# Patient Record
Sex: MALE | Employment: FULL TIME | URBAN - METROPOLITAN AREA
[De-identification: names, ages, dates, MRNs, and addresses within clinical notes are randomized per-mention and may not be internally consistent; named-entity substitution may affect disease eponyms.]

---

## 2023-10-20 ENCOUNTER — OFFICE VISIT (OUTPATIENT)
Dept: PRIMARY CARE | Facility: CLINIC | Age: 38
End: 2023-10-20
Payer: COMMERCIAL

## 2023-10-20 VITALS
WEIGHT: 220 LBS | TEMPERATURE: 97 F | DIASTOLIC BLOOD PRESSURE: 80 MMHG | BODY MASS INDEX: 29.8 KG/M2 | SYSTOLIC BLOOD PRESSURE: 142 MMHG | HEIGHT: 72 IN

## 2023-10-20 DIAGNOSIS — Z00.00 HEALTHCARE MAINTENANCE: Primary | ICD-10-CM

## 2023-10-20 PROCEDURE — 99395 PREV VISIT EST AGE 18-39: CPT | Performed by: INTERNAL MEDICINE

## 2023-10-20 PROCEDURE — 85025 COMPLETE CBC W/AUTO DIFF WBC: CPT | Performed by: INTERNAL MEDICINE

## 2023-10-20 PROCEDURE — 80061 LIPID PANEL: CPT | Performed by: INTERNAL MEDICINE

## 2023-10-20 PROCEDURE — 1036F TOBACCO NON-USER: CPT | Performed by: INTERNAL MEDICINE

## 2023-10-20 PROCEDURE — 80053 COMPREHEN METABOLIC PANEL: CPT | Performed by: INTERNAL MEDICINE

## 2023-10-20 PROCEDURE — 84443 ASSAY THYROID STIM HORMONE: CPT | Performed by: INTERNAL MEDICINE

## 2023-10-20 ASSESSMENT — ENCOUNTER SYMPTOMS
DEPRESSION: 0
OCCASIONAL FEELINGS OF UNSTEADINESS: 0
LOSS OF SENSATION IN FEET: 0

## 2023-10-20 ASSESSMENT — PAIN SCALES - GENERAL: PAINLEVEL: 0-NO PAIN

## 2023-10-23 ENCOUNTER — TELEPHONE (OUTPATIENT)
Dept: PRIMARY CARE | Facility: CLINIC | Age: 38
End: 2023-10-23

## 2023-10-23 DIAGNOSIS — F90.9 ATTENTION DEFICIT HYPERACTIVITY DISORDER (ADHD), UNSPECIFIED ADHD TYPE: ICD-10-CM

## 2023-11-12 DIAGNOSIS — I10 HYPERTENSION, UNSPECIFIED TYPE: Primary | ICD-10-CM

## 2023-11-12 RX ORDER — OLMESARTAN MEDOXOMIL 20 MG/1
20 TABLET ORAL DAILY
COMMUNITY
End: 2023-11-12 | Stop reason: SDUPTHER

## 2023-11-12 RX ORDER — OLMESARTAN MEDOXOMIL 20 MG/1
20 TABLET ORAL DAILY
Qty: 90 TABLET | Refills: 3 | Status: SHIPPED | OUTPATIENT
Start: 2023-11-12

## 2023-12-18 ENCOUNTER — TELEPHONE (OUTPATIENT)
Dept: PRIMARY CARE | Facility: CLINIC | Age: 38
End: 2023-12-18
Payer: COMMERCIAL

## 2023-12-18 DIAGNOSIS — Z83.79 FAMILY HISTORY OF CELIAC DISEASE: Primary | ICD-10-CM

## 2024-01-08 NOTE — PROGRESS NOTES
Patient refused flu shot  
Subjective   Patient ID: Scott Garcia is a 38 y.o. male who presents for health maintenance.    HPI   38 years old male comes in to see me open change few years/with the same medical condition including ADHD and generalized anxiety disorder.  He has a history of hypertension.  He is  and has 3 children he lives in [place].  He is an owner of 2 businesses.  His medications are olmesartan for hypertension, Adderall 20 mg in the morning 20 mg at night ER.  He was taking an antidepressant called Trintellix but now he stopped taking it for the last 4 to 6 weeks because he feels he is not depressed, he also take gabapentin 300 mg 2 or 3 a day for sciatica pain.  Lamotrigine 1 a day in the morning to replace known addictive drug for antianxiety.  He was seeing a psychiatrist on a regular basis [place] but she moved and left to [place].  He is about to connect with a new psychiatrist.  Review of Systems  12 system review 12 systems are negative  Objective   /80 (BP Location: Left arm, Patient Position: Sitting, BP Cuff Size: Large adult)   Temp 36.1 °C (97 °F) (Temporal)   Ht 1.829 m (6')   Wt 99.8 kg (220 lb)   BMI 29.84 kg/m²     Physical Exam  Alert oriented in no acute distress.  Pleasant cooperative.  Nonicteric sclera no jaundice.  Neck supple no masses no lymph node thyromegaly or jugular venous distention.  Face symmetrical cranial nerves intact.  Lungs clear no rales wheezing or crackles.  Heart normal S1 and S2 regular rhythm.  Abdomen benign neurologically intact  Assessment/Plan   Diagnoses and all orders for this visit:  Healthcare maintenance  -     CBC  -     Comprehensive Metabolic Panel  -     Lipid Panel  -     Thyroid Stimulating Hormone         
Patient requests all Lab, Cardiology, and Radiology Results on their Discharge Instructions

## 2024-02-10 ENCOUNTER — LAB (OUTPATIENT)
Dept: LAB | Facility: LAB | Age: 39
End: 2024-02-10
Payer: COMMERCIAL

## 2024-02-10 DIAGNOSIS — Z83.79 FAMILY HISTORY OF CELIAC DISEASE: ICD-10-CM

## 2024-02-10 PROCEDURE — 86258 DGP ANTIBODY EACH IG CLASS: CPT

## 2024-02-10 PROCEDURE — 86364 TISS TRNSGLTMNASE EA IG CLAS: CPT

## 2024-02-10 PROCEDURE — 83516 IMMUNOASSAY NONANTIBODY: CPT

## 2024-02-10 PROCEDURE — 36415 COLL VENOUS BLD VENIPUNCTURE: CPT

## 2024-02-11 LAB
GLIADIN PEPTIDE IGA SER IA-ACNC: 1.9 U/ML
GLIADIN PEPTIDE IGG SER IA-ACNC: NORMAL
TTG IGA SER IA-ACNC: <1 U/ML
TTG IGG SER IA-ACNC: NORMAL

## 2024-02-13 LAB
GLIADIN PEPTIDE IGG SER IA-ACNC: 0.58 FLU (ref 0–4.99)
TTG IGG SER IA-ACNC: <0.82 FLU (ref 0–4.99)

## 2024-06-13 ENCOUNTER — TELEPHONE (OUTPATIENT)
Dept: PRIMARY CARE | Facility: CLINIC | Age: 39
End: 2024-06-13

## 2024-06-13 ENCOUNTER — OFFICE VISIT (OUTPATIENT)
Dept: PRIMARY CARE | Facility: CLINIC | Age: 39
End: 2024-06-13
Payer: COMMERCIAL

## 2024-06-13 VITALS
HEIGHT: 72 IN | BODY MASS INDEX: 27.9 KG/M2 | SYSTOLIC BLOOD PRESSURE: 134 MMHG | TEMPERATURE: 97.7 F | DIASTOLIC BLOOD PRESSURE: 82 MMHG | WEIGHT: 206 LBS

## 2024-06-13 DIAGNOSIS — S29.011A PECTORALIS MUSCLE STRAIN, INITIAL ENCOUNTER: Primary | ICD-10-CM

## 2024-06-13 PROCEDURE — 1036F TOBACCO NON-USER: CPT | Performed by: INTERNAL MEDICINE

## 2024-06-13 PROCEDURE — 99213 OFFICE O/P EST LOW 20 MIN: CPT | Performed by: INTERNAL MEDICINE

## 2024-06-13 ASSESSMENT — PAIN SCALES - GENERAL: PAINLEVEL: 6

## 2024-06-13 ASSESSMENT — ENCOUNTER SYMPTOMS
LOSS OF SENSATION IN FEET: 0
DEPRESSION: 0
OCCASIONAL FEELINGS OF UNSTEADINESS: 0

## 2024-06-13 NOTE — PROGRESS NOTES
Subjective   Patient ID: Scott Garcia is a 39 y.o. male who presents for Hypertension, Hypothyroidism, Hyperlipidemia, and Diabetes.    HPI   39 years old male comes in to see me because he sprained his right pectoral muscle last night when he was helping a friend plug prior.  He felt sharp pain instantly when he was down on his knees working on a tire.  He used ice on it right away and it helped.  Today still feels sore and painful he thought he ruptured something.  He wanted to be checked for that reason.  Review of Systems  12 system reviewed 12 system are negative.  Objective   /82 (BP Location: Left arm, Patient Position: Sitting, BP Cuff Size: Adult)   Temp 36.5 °C (97.7 °F) (Temporal)   Ht 1.829 m (6')   Wt 93.4 kg (206 lb)   BMI 27.94 kg/m²     Physical Exam  Alert oriented in no distress.  Nonicteric sclera or jaundice.  Face symmetrical cranial nerves intact.  Neck supple no masses no lymph node no thyromegaly.  Lungs clear no rales wheezing or crackles.  Heart normal S1 and S2 regular rhythm.  Abdomen benign and neurologically intact.  Examination of the right shoulder and arm within normal limit.  He is able to move his arm in all direction.  He had no vascular or neurological deficit.  Pain at the area between the pectoral muscle and the arm or shoulder near the anterior part of the axilla.  Mildly tender to touch.  He strained his muscle obviously.  Stop lifting do not do anything with the right arm to be gentle with it.  Use heating pad on it on and off for half an hour every couple hours for few days.  Motrin lotion to massage it with.  You may take either Tylenol or Advil or Motrin if you have any pain.  Give it few days it will heal okay  Assessment/Plan   Diagnoses and all orders for this visit:  Pectoralis muscle strain, initial encounter

## 2024-06-13 NOTE — TELEPHONE ENCOUNTER
Pt called and would like an mri done. He believes he tore his right pectoral muscle last night while plugging a tire. He did not want to schedule he request a call.

## 2024-07-01 ENCOUNTER — TELEPHONE (OUTPATIENT)
Dept: PRIMARY CARE | Facility: CLINIC | Age: 39
End: 2024-07-01
Payer: COMMERCIAL

## 2024-07-01 NOTE — TELEPHONE ENCOUNTER
"Pt called and would like test done on his right rao. It still hurt to the touch and it is \"misshaped\". He would like an MRI, Ultrasound or something done to see what is going on with it.     He states he usually workout but couldn't since this pain is still there.   "

## 2024-07-02 DIAGNOSIS — S29.9XXA CHEST INJURY, INITIAL ENCOUNTER: Primary | ICD-10-CM

## 2024-07-03 ENCOUNTER — HOSPITAL ENCOUNTER (OUTPATIENT)
Dept: RADIOLOGY | Facility: HOSPITAL | Age: 39
Discharge: HOME | End: 2024-07-03
Payer: COMMERCIAL

## 2024-07-03 ENCOUNTER — CLINICAL SUPPORT (OUTPATIENT)
Dept: ORTHOPEDIC SURGERY | Facility: CLINIC | Age: 39
End: 2024-07-03
Payer: COMMERCIAL

## 2024-07-03 DIAGNOSIS — M25.511 RIGHT SHOULDER PAIN, UNSPECIFIED CHRONICITY: ICD-10-CM

## 2024-07-03 DIAGNOSIS — S49.91XD RIGHT SHOULDER INJURY, SUBSEQUENT ENCOUNTER: Primary | ICD-10-CM

## 2024-07-03 DIAGNOSIS — S29.011A TRAUMATIC RUPTURE OF RIGHT PECTORALIS MAJOR TENDON: Primary | ICD-10-CM

## 2024-07-03 DIAGNOSIS — M25.511 RIGHT SHOULDER PAIN, UNSPECIFIED CHRONICITY: Primary | ICD-10-CM

## 2024-07-03 DIAGNOSIS — S49.91XA RIGHT SHOULDER INJURY, INITIAL ENCOUNTER: ICD-10-CM

## 2024-07-03 DIAGNOSIS — S49.91XD RIGHT SHOULDER INJURY, SUBSEQUENT ENCOUNTER: ICD-10-CM

## 2024-07-03 PROCEDURE — 99204 OFFICE O/P NEW MOD 45 MIN: CPT

## 2024-07-03 PROCEDURE — 73030 X-RAY EXAM OF SHOULDER: CPT | Mod: RIGHT SIDE | Performed by: RADIOLOGY

## 2024-07-03 PROCEDURE — 73030 X-RAY EXAM OF SHOULDER: CPT | Mod: RT

## 2024-07-03 PROCEDURE — 71550 MRI CHEST W/O DYE: CPT

## 2024-07-03 NOTE — PROGRESS NOTES
"HPI  Fran Garcia is a 39 y.o. male  in office today for   Chief Complaint   Patient presents with    Right Shoulder - Pain     Pt states he was plugging a tire and felt a painful shock in his shoulder, side of pec and under arm. He doesn't have a lot ofpain-he has been trying to keep the arm moving, but is noticing a \"hole\" in his muscle when he attempts to push anything. He feels like his pec muscle is uneven when flexing.    .  he states that he did this 3 weeks ago.  Saw his PCP who told him to refrain from lifting and to massage the area which he has been doing.      Medication  Current Outpatient Medications on File Prior to Visit   Medication Sig Dispense Refill    olmesartan (BENIcar) 20 mg tablet Take 1 tablet (20 mg) by mouth once daily. 90 tablet 3     No current facility-administered medications on file prior to visit.       Physical Exam  Constitutional: well developed, well nourished male in no acute distress  Psychiatric: normal mood, appropriate affect  Eyes: sclera anicteric  HENT: normocephalic/atraumatic  CV: regular rate and rhythm   Respiratory: non labored breathing  Integumentary: no rash  Neurological: moves all extremities    Right Shoulder Exam     Tenderness   The patient is experiencing no tenderness.    Range of Motion   The patient has normal right shoulder ROM.    Comments:  Visible deformity at pec insertion, axillary side with tendon retraction on palpation. Nipple location is symmetric.  No edema or ecchymosis.              Imaging/Lab:  X-rays were taken today which were reviewed by myself and read by myself and show no acute fracture or dislocation.  No degenerative changes of the shoulder      Assessment  Assessment: Right shoulder injury with suspect pectoralis tendon rupture    Plan  Plan:  History, physical exam, and imaging were reviewed with patient. Given visible deformity of the pectoralis muscle, ordering MRI to assess for tendon rupture.  Should continue to not lift " weights.  RICE and antiinflammatories as needed.  Follow Up: After MRI, will refer to Dr Reyes as appropriate    All questions were answered for the patient prior to end of exam and patient addressed their understanding.    Day Badillo PA-C  07/03/24

## 2024-07-04 ENCOUNTER — TELEPHONE (OUTPATIENT)
Dept: PRIMARY CARE | Facility: CLINIC | Age: 39
End: 2024-07-04
Payer: COMMERCIAL

## 2024-07-04 NOTE — TELEPHONE ENCOUNTER
I called the patient this morning on July 4 to inform him that he did have a rupture of the tendon on the MR and x-ray and he need to see shoulder specialist orthopedist which she is with the intervention of Dr. Bravo and Dr. Marin who is familiar with tense father and will see him on Tuesday take care of business.  I advised him to take it easy no lifting no chopping wood and to put a sling on his arm in case he is in pain take Tylenol or Advil until he sees the orthopedist.  He was aware of the findings since the orthopedist called him yesterday.

## 2024-07-05 ENCOUNTER — TELEPHONE (OUTPATIENT)
Dept: ORTHOPEDIC SURGERY | Facility: CLINIC | Age: 39
End: 2024-07-05
Payer: COMMERCIAL

## 2024-07-05 NOTE — TELEPHONE ENCOUNTER
Per Day Badillo PA-C, LM for patient that she needs him to see Dr Reyes in [place] Monday to go over his MRI he does have a pectoral tear in his right shoulder    Admission Reconciliation is Completed  Discharge Reconciliation is Completed

## 2024-07-09 ENCOUNTER — OFFICE VISIT (OUTPATIENT)
Dept: ORTHOPEDIC SURGERY | Facility: HOSPITAL | Age: 39
End: 2024-07-09
Payer: COMMERCIAL

## 2024-07-09 VITALS — WEIGHT: 207 LBS | BODY MASS INDEX: 28.04 KG/M2 | HEIGHT: 72 IN

## 2024-07-09 DIAGNOSIS — S29.011A TRAUMATIC RUPTURE OF RIGHT PECTORALIS MAJOR TENDON: ICD-10-CM

## 2024-07-09 DIAGNOSIS — S29.9XXA CHEST INJURY, INITIAL ENCOUNTER: ICD-10-CM

## 2024-07-09 PROCEDURE — L3670 SO ACRO/CLAV CAN WEB PRE OTS: HCPCS | Performed by: ORTHOPAEDIC SURGERY

## 2024-07-09 PROCEDURE — 1036F TOBACCO NON-USER: CPT | Performed by: ORTHOPAEDIC SURGERY

## 2024-07-09 PROCEDURE — 99214 OFFICE O/P EST MOD 30 MIN: CPT | Performed by: ORTHOPAEDIC SURGERY

## 2024-07-09 PROCEDURE — 99204 OFFICE O/P NEW MOD 45 MIN: CPT | Performed by: ORTHOPAEDIC SURGERY

## 2024-07-09 RX ORDER — DEXTROAMPHETAMINE SACCHARATE, AMPHETAMINE ASPARTATE, DEXTROAMPHETAMINE SULFATE AND AMPHETAMINE SULFATE 1.25; 1.25; 1.25; 1.25 MG/1; MG/1; MG/1; MG/1
1 TABLET ORAL NIGHTLY
COMMUNITY
Start: 2024-06-12

## 2024-07-09 RX ORDER — LAMOTRIGINE 100 MG/1
1 TABLET ORAL
COMMUNITY
Start: 2024-04-01

## 2024-07-09 RX ORDER — GABAPENTIN 300 MG/1
1 CAPSULE ORAL
COMMUNITY
Start: 2024-06-12

## 2024-07-09 ASSESSMENT — PAIN - FUNCTIONAL ASSESSMENT: PAIN_FUNCTIONAL_ASSESSMENT: 0-10

## 2024-07-09 ASSESSMENT — PAIN SCALES - GENERAL: PAINLEVEL_OUTOF10: 2

## 2024-07-09 NOTE — LETTER
July 9, 2024     Patient: Fran Garcia   YOB: 1985   Date of Visit: 7/9/2024       To Whom It May Concern:    It is my medical opinion that Fran Garcia  should not perform any work until seen for his first post-operative visit in approximately 2 weeks .    If you have any questions or concerns, please don't hesitate to call.         Sincerely,        Ti Bravo MD    CC: No Recipients

## 2024-07-09 NOTE — PROGRESS NOTES
Subjective   Patient ID: Fran Garcia is a 39 y.o. male    Chief Complaint:   Chief Complaint   Patient presents with    Right Shoulder - Pain        Last Surgery: No surgery found  Date of Last Surgery: No surgery found    HPI  Fran Garcia is a 39 y.o. ambidextrous male presenting for right shoulder pain.     He is here today after hurting himself in June while helping a friend plug a tire. He initially felt a sharp pain. He iced and used OTC medications as needed and felt better. However, his pain has continued and it is affecting his daily activities.     He does like weight lifting and is on several       Objective   Patient is a well-developed, well-nourished male in no acute distress.  Breathes with normal chest rises.  Pupils are round and symmetric today.  Awake, alert, and oriented x3.      Examination of the left shoulder today reveals the skin to be intact. There is no sign of any atrophy, lesions, or abrasions. There is no pain to palpation of the bony prominences. Cervical lymphadenopathy examined, and this was negative. Patient had 5 out of 5 wrist flexion, extension, and thumb extension bilaterally. Sensation was intact to light touch to median, ulnar, radial axillary, and musculocutaneous nerves bilaterally. Positive radial pulse bilaterally. Provocative maneuvers today were negative. Range of motion of the left shoulder revealed 0-170° of forward elevation, 0-60° of external rotation, and internal rotation was to T-12.     Examination of the right shoulder today reveals the skin to be intact. There is no sign of any atrophy, lesions, or abrasions. There is no pain to palpation of the bony prominences. Cervical lymphadenopathy examined, and this was negative. Patient had 5 out of 5 wrist flexion, extension, and thumb extension, bilaterally. Sensation was intact to light touch to median, ulnar, radial, axillary, and musculocutaneous nerves bilaterally. Positive radial pulse bilaterally. Provocative  maneuvers today were negative.  Range of motion of the right shoulder revealed 0-170° of forward elevation, 0-60° of external rotation, and internal rotation up to T-12. Obvious defect in axillary fold. Palpable lump along pectoralis major.       Imaging:  X-rays of the right shoulder taken 07/03/24 and interpreted by me show no signs of any fracture, dislocation, arthritis or proximal humerus migration.  Some AC joint arthritis, asymptomatic     Assessment/Plan   Chest injury, initial encounter  Patient with a 4 week old right pectoralis rupture    We had a long discussion regarding his diagnosis. We talked about recovery. He will be in a sling for two weeks post surgery. He was encouraged to get an ice machine to aid in recovery. We discussed the risks of surgery including infection, bleeding, and nerve injury. He understands that 90-95% function is expected after surgery. He understands it is out patient and a nerve block will be performed. He cannot pull, push, or reach for things after surgery. 4 month recovery.  We are recmending surgery for this    Patient was prescribed a shoulder sling for postoperative care. The patient will have weakness, instability and/or deformity of their right arm which requires stabilization from this orthosis to improve their function after surgery.     Verbal and written instructions for the use, wear schedule, cleaning and application of this item were given. Patient was instructed that should the brace result in increased pain, decreased sensation, increased swelling, or an overall worsening of their medical condition, to please contact our office immediately at [phone].     Orthotic management and training was provided for skin care, modifications due to healing tissues, edema changes, interruption in skin integrity, and safety precautions with the orthosis.    PLAN for RIGHT pectoralis major repair   No orders of the defined types were placed in this encounter.    Follow  up will be scheduled appropriately.     Scribe Attestation  By signing my name below, I, Bekah Steve   attest that this documentation has been prepared under the direction and in the presence of Ti Bravo MD.

## 2024-07-10 ENCOUNTER — LAB (OUTPATIENT)
Dept: LAB | Facility: LAB | Age: 39
End: 2024-07-10
Payer: COMMERCIAL

## 2024-07-10 ENCOUNTER — PRE-ADMISSION TESTING (OUTPATIENT)
Dept: PREADMISSION TESTING | Facility: HOSPITAL | Age: 39
End: 2024-07-10
Payer: COMMERCIAL

## 2024-07-10 VITALS
DIASTOLIC BLOOD PRESSURE: 74 MMHG | RESPIRATION RATE: 14 BRPM | OXYGEN SATURATION: 98 % | SYSTOLIC BLOOD PRESSURE: 143 MMHG | HEIGHT: 72 IN | HEART RATE: 79 BPM | BODY MASS INDEX: 28.37 KG/M2 | WEIGHT: 209.44 LBS | TEMPERATURE: 97.9 F

## 2024-07-10 DIAGNOSIS — S29.011A TRAUMATIC RUPTURE OF RIGHT PECTORALIS MAJOR TENDON: ICD-10-CM

## 2024-07-10 DIAGNOSIS — Z01.818 PREOP TESTING: ICD-10-CM

## 2024-07-10 DIAGNOSIS — Z01.818 PREOP TESTING: Primary | ICD-10-CM

## 2024-07-10 LAB
ANION GAP SERPL CALC-SCNC: 14 MMOL/L (ref 10–20)
BUN SERPL-MCNC: 19 MG/DL (ref 6–23)
CALCIUM SERPL-MCNC: 9.7 MG/DL (ref 8.6–10.3)
CHLORIDE SERPL-SCNC: 99 MMOL/L (ref 98–107)
CO2 SERPL-SCNC: 27 MMOL/L (ref 21–32)
CREAT SERPL-MCNC: 0.97 MG/DL (ref 0.5–1.3)
EGFRCR SERPLBLD CKD-EPI 2021: >90 ML/MIN/1.73M*2
ERYTHROCYTE [DISTWIDTH] IN BLOOD BY AUTOMATED COUNT: 12.3 % (ref 11.5–14.5)
GLUCOSE SERPL-MCNC: 88 MG/DL (ref 74–99)
HCT VFR BLD AUTO: 44 % (ref 41–52)
HGB BLD-MCNC: 15 G/DL (ref 13.5–17.5)
MCH RBC QN AUTO: 28.7 PG (ref 26–34)
MCHC RBC AUTO-ENTMCNC: 34.1 G/DL (ref 32–36)
MCV RBC AUTO: 84 FL (ref 80–100)
NRBC BLD-RTO: NORMAL /100{WBCS}
PLATELET # BLD AUTO: 327 X10*3/UL (ref 150–450)
POTASSIUM SERPL-SCNC: 4.1 MMOL/L (ref 3.5–5.3)
RBC # BLD AUTO: 5.22 X10*6/UL (ref 4.5–5.9)
SODIUM SERPL-SCNC: 136 MMOL/L (ref 136–145)
WBC # BLD AUTO: 9.2 X10*3/UL (ref 4.4–11.3)

## 2024-07-10 PROCEDURE — 85027 COMPLETE CBC AUTOMATED: CPT

## 2024-07-10 PROCEDURE — 36415 COLL VENOUS BLD VENIPUNCTURE: CPT

## 2024-07-10 PROCEDURE — 80048 BASIC METABOLIC PNL TOTAL CA: CPT

## 2024-07-10 PROCEDURE — 99203 OFFICE O/P NEW LOW 30 MIN: CPT

## 2024-07-10 RX ORDER — LORAZEPAM 0.5 MG/1
0.5 TABLET ORAL EVERY 6 HOURS PRN
COMMUNITY

## 2024-07-10 RX ORDER — IBUPROFEN 600 MG/1
600 TABLET ORAL EVERY 6 HOURS PRN
COMMUNITY

## 2024-07-10 RX ORDER — DEXTROMETHORPHAN HYDROBROMIDE, GUAIFENESIN 5; 100 MG/5ML; MG/5ML
650 LIQUID ORAL EVERY 8 HOURS PRN
COMMUNITY

## 2024-07-10 RX ORDER — BISMUTH SUBSALICYLATE 262 MG
1 TABLET,CHEWABLE ORAL DAILY
COMMUNITY

## 2024-07-10 ASSESSMENT — DUKE ACTIVITY SCORE INDEX (DASI)
CAN YOU DO LIGHT WORK AROUND THE HOUSE LIKE DUSTING OR WASHING DISHES: YES
CAN YOU WALK A BLOCK OR TWO ON LEVEL GROUND: YES
DASI METS SCORE: 9.9
CAN YOU DO HEAVY WORK AROUND THE HOUSE LIKE SCRUBBING FLOORS OR LIFTING AND MOVING HEAVY FURNITURE: YES
CAN YOU PARTICIPATE IN MODERATE RECREATIONAL ACTIVITIES LIKE GOLF, BOWLING, DANCING, DOUBLES TENNIS OR THROWING A BASEBALL OR FOOTBALL: YES
CAN YOU RUN A SHORT DISTANCE: YES
CAN YOU DO YARD WORK LIKE RAKING LEAVES, WEEDING OR PUSHING A MOWER: YES
CAN YOU CLIMB A FLIGHT OF STAIRS OR WALK UP A HILL: YES
CAN YOU DO MODERATE WORK AROUND THE HOUSE LIKE VACUUMING, SWEEPING FLOORS OR CARRYING GROCERIES: YES
CAN YOU TAKE CARE OF YOURSELF (EAT, DRESS, BATHE, OR USE TOILET): YES
CAN YOU HAVE SEXUAL RELATIONS: YES
CAN YOU WALK INDOORS, SUCH AS AROUND YOUR HOUSE: YES
TOTAL_SCORE: 58.2
CAN YOU PARTICIPATE IN STRENOUS SPORTS LIKE SWIMMING, SINGLES TENNIS, FOOTBALL, BASKETBALL, OR SKIING: YES

## 2024-07-10 ASSESSMENT — PAIN SCALES - GENERAL: PAINLEVEL_OUTOF10: 5 - MODERATE PAIN

## 2024-07-10 NOTE — H&P (VIEW-ONLY)
"CPM/PAT Evaluation       Name: Fran Garcia (Fran Garcia \"Scott\")  /Age: 1985/39 y.o.     In-Person       Chief Complaint: Traumatic rupture of right pectoralis major tendon     HPI  Patient is an alert and oriented 39 year old male scheduled for a right pectoralis on 2024 with Dr. Bravo for traumatic rupture of right pectoralis major tendon. PMHX includes HTN. Presents to [facility] today for perioperative risk stratification and optimization.     Past Medical History:   Diagnosis Date    Awareness under anesthesia     Fracture of unspecified carpal bone, unspecified wrist, initial encounter for closed fracture     Wrist fracture    Hypertension      Past Surgical History:   Procedure Laterality Date    APPENDECTOMY  2015    Appendectomy     Patient  has no history on file for sexual activity.    No family history on file.    No Known Allergies    Prior to Admission medications    Medication Sig Start Date End Date Taking? Authorizing Provider   amphetamine-dextroamphetamine (Adderall) 5 mg tablet Take 1 tablet (5 mg) by mouth once daily at bedtime. 24  Yes Historical Provider, MD   gabapentin (Neurontin) 300 mg capsule Take 1 capsule (300 mg) by mouth 3 times a day. 24  Yes Historical Provider, MD   lamoTRIgine (LaMICtal) 100 mg tablet Take 1 tablet (100 mg) by mouth every 12 hours. 24  Yes Historical Provider, MD   multivitamin tablet Take 1 tablet by mouth once daily.   Yes Historical Provider, MD   olmesartan (BENIcar) 20 mg tablet Take 1 tablet (20 mg) by mouth once daily. 23  Yes Santhosh Hoff MD       Review of Systems   Constitutional: Negative for chills, decreased appetite, diaphoresis, fever and malaise/fatigue.   Eyes:  Negative for blurred vision and double vision.   Cardiovascular:  Negative for chest pain, claudication, cyanosis, dyspnea on exertion, irregular heartbeat, leg swelling, near-syncope and palpitations.   Respiratory:  Negative for cough, hemoptysis, " shortness of breath and wheezing.    Endocrine: Negative for cold intolerance, heat intolerance, polydipsia, polyphagia and polyuria.   Gastrointestinal:  Negative for abdominal pain, constipation, diarrhea, dysphagia, nausea and vomiting.   Genitourinary:  Negative for bladder incontinence, dysuria, hematuria, incomplete emptying, nocturia, frequency, pelvic pain and urgency.   Neurological:  Negative for headaches, light-headedness, paresthesias, sensory change and weakness.   Psychiatric/Behavioral:  Negative for altered mental status.    Musculoskeletal: Negative for myalgias, arthralgias     Vitals and nursing note reviewed.     Physical exam  Constitutional:       Appearance: Normal appearance. He is Overweight.   HENT:      Head: Normocephalic and atraumatic.      Mouth/Throat:      Mouth: Mucous membranes are moist.      Pharynx: Oropharynx is clear.   Eyes:      Extraocular Movements: Extraocular movements intact.      Conjunctiva/sclera: Conjunctivae normal.      Pupils: Pupils are equal, round, and reactive to light.   Cardiovascular:      PMI at left midclavicular line. Normal rate. Regular rhythm. Normal S1. Normal S2.       Murmurs: There is no murmur.      No gallop.  No click. No rub.       No audible carotid bruit     No lower extremity edema on exam  Pulmonary:      Effort: Pulmonary effort is normal.      Breath sounds: Normal breath sounds.   Abdominal:      General: Abdomen is flat. Bowel sounds are normal.      Palpations: Abdomen is soft and non-tender  Musculoskeletal:      Cervical back: Normal range of motion and neck supple.   Skin:     General: Skin is warm and dry.      Capillary Refill: Capillary refill takes less than 2 seconds.   Neurological:      General: No focal deficit present.      Mental Status: He is alert and oriented to person, place, and time. Mental status is at baseline.   Psychiatric:         Mood and Affect: Mood normal.         Behavior: Behavior normal.          "Thought Content: Thought content normal.         Judgment: Judgment normal.     Vitals and nursing note reviewed. Physical exam within normal limits.     Visit Vitals  /74   Pulse 79   Temp 36.6 °C (97.9 °F) (Temporal)   Resp 14   Ht 1.828 m (5' 11.97\")   Wt 95 kg (209 lb 7 oz)   SpO2 98%   BMI 28.43 kg/m²   Smoking Status Never   BSA 2.2 m²      DASI Risk Score      Flowsheet Row Most Recent Value   DASI SCORE 58.2   METS Score (Will be calculated only when all the questions are answered) 9.9          Caprini DVT Assessment      Flowsheet Row Most Recent Value   DVT Score 3   Current Status Major surgery planned, including arthroscopic and laproscopic (1-2 hours)   Age Less than 40 years   BMI 30 or less          Modified Frailty Index      Flowsheet Row Most Recent Value   Modified Frailty Index Calculator .0909          CHADS2 Stroke Risk  Current as of 3 hours ago        N/A 3 to 100%: High Risk   2 to < 3%: Medium Risk   0 to < 2%: Low Risk     Last Change: N/A          This score determines the patient's risk of having a stroke if the patient has atrial fibrillation.        This score is not applicable to this patient. Components are not calculated.          Revised Cardiac Risk Index      Flowsheet Row Most Recent Value   Revised Cardiac Risk Calculator 0          Apfel Simplified Score    No data to display       Risk Analysis Index Results This Encounter    No data found in the last 1 encounters.       Stop Bang Score      Flowsheet Row Most Recent Value   Do you snore loudly? 0   Do you often feel tired or fatigued after your sleep? 0   Has anyone ever observed you stop breathing in your sleep? 0   Do you have or are you being treated for high blood pressure? 1   Recent BMI (Calculated) 28.4   Is BMI greater than 35 kg/m2? 0=No   Age older than 50 years old? 0=No   Is your neck circumference greater than 17 inches (Male) or 16 inches (Female)? 0   Gender - Male 1=Yes   STOP-BANG Total Score 2      "     Assessment & Plan:    Neuro:  No diagnosis or significant findings on chart review or clinical presentation and evaluation.     HEENT/Airway:  No diagnosis or significant findings on chart review or clinical presentation and evaluation.   STOP-BANG Score-2 points low risk for CHANI    Mallampati::  II    TM distance::  >3 FB    Neck ROM::  Full  Dentures-denies  Crowns-denies  Implants-denies    Cardiovascular:  No significant findings on chart review or clinical presentation and evaluation.   History of Hypertension-Managed with Olmesartan. BP in /74  METS: 9.9  RCRI: 0 points, 3.9%  risk for postoperative MACE   RAUL: 0.1% risk for postoperative MACE    Pulmonary:  No diagnosis or significant findings on chart review or clinical presentation and evaluation.   ARISCAT: <26 points, 1.6% risk of in-hospital postoperative pulmonary complication  PRODIGY: Moderate risk for opioid induced respiratory depression  Smoking History-denies    Renal:  No diagnosis or significant findings on chart review or clinical presentation and evaluation, however, the patient is at increased risk of perioperative renal complications secondary to male sex and HTN. Preventative measures include BP monitoring, medication compliance, and hydration management.   BMP-Pending    Endocrine:  No diagnosis or significant findings on chart review or clinical presentation and evaluation.     Hematologic/Immunology:  No diagnosis or significant findings on chart review or clinical presentation and evaluation.  CBC-Pending  HGB-Pending  Caprini Score 3, patient at Moderate for postoperative DVT. Pt supplied education/VTE handout    Gastrointestinal:   No diagnosis or significant findings on chart review or clinical presentation and evaluation.   Alcohol use-denies  Drug use-Marijuana    Infectious disease:   No diagnosis or significant findings on chart review or clinical presentation and evaluation.     Musculoskeletal:   No diagnosis or  significant findings on chart review or clinical presentation and evaluation.   JHFRAT score-3 points. low risk for falls    Anesthesia:  Awareness under anesthesia  No family history of anesthesia complications    Labs & Imaging ordered:  CBC, BMP  Nickel/metal allergy-negative  Shellfish allergy-negative    Overall, patient Low Risk for the scheduled Moderate Risk surgery. Discussed with patient medication instructions, NPO guidelines, and any questions or concerns.     Face to face time-25 minutes

## 2024-07-10 NOTE — CPM/PAT H&P
"CPM/PAT Evaluation       Name: Fran Garcia (Fran Garcia \"Scott\")  /Age: 1985/39 y.o.     In-Person       Chief Complaint: Traumatic rupture of right pectoralis major tendon     HPI  Patient is an alert and oriented 39 year old male scheduled for a right pectoralis on 2024 with Dr. Bravo for traumatic rupture of right pectoralis major tendon. PMHX includes HTN. Presents to [facility] PAT today for perioperative risk stratification and optimization.     Past Medical History:   Diagnosis Date    Awareness under anesthesia     Fracture of unspecified carpal bone, unspecified wrist, initial encounter for closed fracture     Wrist fracture    Hypertension      Past Surgical History:   Procedure Laterality Date    APPENDECTOMY  2015    Appendectomy     Patient  has no history on file for sexual activity.    No family history on file.    No Known Allergies    Prior to Admission medications    Medication Sig Start Date End Date Taking? Authorizing Provider   amphetamine-dextroamphetamine (Adderall) 5 mg tablet Take 1 tablet (5 mg) by mouth once daily at bedtime. 24  Yes Historical Provider, MD   gabapentin (Neurontin) 300 mg capsule Take 1 capsule (300 mg) by mouth 3 times a day. 24  Yes Historical Provider, MD   lamoTRIgine (LaMICtal) 100 mg tablet Take 1 tablet (100 mg) by mouth every 12 hours. 24  Yes Historical Provider, MD   multivitamin tablet Take 1 tablet by mouth once daily.   Yes Historical Provider, MD   olmesartan (BENIcar) 20 mg tablet Take 1 tablet (20 mg) by mouth once daily. 23  Yes Santhosh Hoff MD       Review of Systems   Constitutional: Negative for chills, decreased appetite, diaphoresis, fever and malaise/fatigue.   Eyes:  Negative for blurred vision and double vision.   Cardiovascular:  Negative for chest pain, claudication, cyanosis, dyspnea on exertion, irregular heartbeat, leg swelling, near-syncope and palpitations.   Respiratory:  Negative for cough, hemoptysis, " shortness of breath and wheezing.    Endocrine: Negative for cold intolerance, heat intolerance, polydipsia, polyphagia and polyuria.   Gastrointestinal:  Negative for abdominal pain, constipation, diarrhea, dysphagia, nausea and vomiting.   Genitourinary:  Negative for bladder incontinence, dysuria, hematuria, incomplete emptying, nocturia, frequency, pelvic pain and urgency.   Neurological:  Negative for headaches, light-headedness, paresthesias, sensory change and weakness.   Psychiatric/Behavioral:  Negative for altered mental status.    Musculoskeletal: Negative for myalgias, arthralgias     Vitals and nursing note reviewed.     Physical exam  Constitutional:       Appearance: Normal appearance. He is Overweight.   HENT:      Head: Normocephalic and atraumatic.      Mouth/Throat:      Mouth: Mucous membranes are moist.      Pharynx: Oropharynx is clear.   Eyes:      Extraocular Movements: Extraocular movements intact.      Conjunctiva/sclera: Conjunctivae normal.      Pupils: Pupils are equal, round, and reactive to light.   Cardiovascular:      PMI at left midclavicular line. Normal rate. Regular rhythm. Normal S1. Normal S2.       Murmurs: There is no murmur.      No gallop.  No click. No rub.       No audible carotid bruit     No lower extremity edema on exam  Pulmonary:      Effort: Pulmonary effort is normal.      Breath sounds: Normal breath sounds.   Abdominal:      General: Abdomen is flat. Bowel sounds are normal.      Palpations: Abdomen is soft and non-tender  Musculoskeletal:      Cervical back: Normal range of motion and neck supple.   Skin:     General: Skin is warm and dry.      Capillary Refill: Capillary refill takes less than 2 seconds.   Neurological:      General: No focal deficit present.      Mental Status: He is alert and oriented to person, place, and time. Mental status is at baseline.   Psychiatric:         Mood and Affect: Mood normal.         Behavior: Behavior normal.          "Thought Content: Thought content normal.         Judgment: Judgment normal.     Vitals and nursing note reviewed. Physical exam within normal limits.     Visit Vitals  /74   Pulse 79   Temp 36.6 °C (97.9 °F) (Temporal)   Resp 14   Ht 1.828 m (5' 11.97\")   Wt 95 kg (209 lb 7 oz)   SpO2 98%   BMI 28.43 kg/m²   Smoking Status Never   BSA 2.2 m²      DASI Risk Score      Flowsheet Row Most Recent Value   DASI SCORE 58.2   METS Score (Will be calculated only when all the questions are answered) 9.9          Caprini DVT Assessment      Flowsheet Row Most Recent Value   DVT Score 3   Current Status Major surgery planned, including arthroscopic and laproscopic (1-2 hours)   Age Less than 40 years   BMI 30 or less          Modified Frailty Index      Flowsheet Row Most Recent Value   Modified Frailty Index Calculator .0909          CHADS2 Stroke Risk  Current as of 3 hours ago        N/A 3 to 100%: High Risk   2 to < 3%: Medium Risk   0 to < 2%: Low Risk     Last Change: N/A          This score determines the patient's risk of having a stroke if the patient has atrial fibrillation.        This score is not applicable to this patient. Components are not calculated.          Revised Cardiac Risk Index      Flowsheet Row Most Recent Value   Revised Cardiac Risk Calculator 0          Apfel Simplified Score    No data to display       Risk Analysis Index Results This Encounter    No data found in the last 1 encounters.       Stop Bang Score      Flowsheet Row Most Recent Value   Do you snore loudly? 0   Do you often feel tired or fatigued after your sleep? 0   Has anyone ever observed you stop breathing in your sleep? 0   Do you have or are you being treated for high blood pressure? 1   Recent BMI (Calculated) 28.4   Is BMI greater than 35 kg/m2? 0=No   Age older than 50 years old? 0=No   Is your neck circumference greater than 17 inches (Male) or 16 inches (Female)? 0   Gender - Male 1=Yes   STOP-BANG Total Score 2      "     Assessment & Plan:    Neuro:  No diagnosis or significant findings on chart review or clinical presentation and evaluation.     HEENT/Airway:  No diagnosis or significant findings on chart review or clinical presentation and evaluation.   STOP-BANG Score-2 points low risk for CHANI    Mallampati::  II    TM distance::  >3 FB    Neck ROM::  Full  Dentures-denies  Crowns-denies  Implants-denies    Cardiovascular:  No significant findings on chart review or clinical presentation and evaluation.   History of Hypertension-Managed with Olmesartan. BP in /74  METS: 9.9  RCRI: 0 points, 3.9%  risk for postoperative MACE   RAUL: 0.1% risk for postoperative MACE    Pulmonary:  No diagnosis or significant findings on chart review or clinical presentation and evaluation.   ARISCAT: <26 points, 1.6% risk of in-hospital postoperative pulmonary complication  PRODIGY: Moderate risk for opioid induced respiratory depression  Smoking History-denies    Renal:  No diagnosis or significant findings on chart review or clinical presentation and evaluation, however, the patient is at increased risk of perioperative renal complications secondary to male sex and HTN. Preventative measures include BP monitoring, medication compliance, and hydration management.   BMP-Reviewed, stable    Endocrine:  No diagnosis or significant findings on chart review or clinical presentation and evaluation.     Hematologic/Immunology:  No diagnosis or significant findings on chart review or clinical presentation and evaluation.  CBC-Reviewed, stable  HGB-15.0  Caprini Score 3, patient at Moderate for postoperative DVT. Pt supplied education/VTE handout    Gastrointestinal:   No diagnosis or significant findings on chart review or clinical presentation and evaluation.   Alcohol use-denies  Drug use-Marijuana    Infectious disease:   No diagnosis or significant findings on chart review or clinical presentation and evaluation.     Musculoskeletal:   No  diagnosis or significant findings on chart review or clinical presentation and evaluation.   JHFRAT score-3 points. low risk for falls    Anesthesia:  Awareness under anesthesia  No family history of anesthesia complications    Labs & Imaging ordered:  CBC, BMP  Nickel/metal allergy-negative  Shellfish allergy-negative    Overall, patient Low Risk for the scheduled Moderate Risk surgery. Discussed with patient medication instructions, NPO guidelines, and any questions or concerns.     Face to face time-25 minutes

## 2024-07-10 NOTE — PREPROCEDURE INSTRUCTIONS
Medication List            Accurate as of July 10, 2024  1:15 PM. Always use your most recent med list.                acetaminophen 650 mg ER tablet  Commonly known as: Tylenol 8 HOUR  Medication Adjustments for Surgery: Take morning of surgery with sip of water, no other fluids     amphetamine-dextroamphetamine 5 mg tablet  Commonly known as: Adderall  Medication Adjustments for Surgery: Stop 1 day before surgery  Notes to patient: Last dose preoperatively 7/10/2024     gabapentin 300 mg capsule  Commonly known as: Neurontin  Medication Adjustments for Surgery: Take morning of surgery with sip of water, no other fluids     ibuprofen 600 mg tablet  Medication Adjustments for Surgery: Stop 7 days before surgery  Notes to patient: Last dose preoperatively 7/3/2024     lamoTRIgine 100 mg tablet  Commonly known as: LaMICtal  Medication Adjustments for Surgery: Take morning of surgery with sip of water, no other fluids     LORazepam 0.5 mg tablet  Commonly known as: Ativan  Medication Adjustments for Surgery: Other (Comment)  Notes to patient: Continue as prescribed     multivitamin tablet  Medication Adjustments for Surgery: Stop 7 days before surgery  Notes to patient: Last dose preoperatively 7/3/2024     olmesartan 20 mg tablet  Commonly known as: BENIcar  Take 1 tablet (20 mg) by mouth once daily.  Medication Adjustments for Surgery: Stop 1 day before surgery  Notes to patient: Last dose preoperatively 7/10/2024, am dose only if applicable            NPO Instructions:     Do not eat any food after midnight the night before your surgery/procedure.  You may have clear liquids until TWO hours before surgery/procedure. This includes water, black tea/coffee, (no milk or cream) apple juice and electrolyte drinks (Gatorade).  You may chew gum up to TWO hours before your surgery/procedure.     Additional Instructions:      Seven/Six Days before Surgery:  Review your medication instructions, stop indicated  medications  Five Days before Surgery:  Review your medication instructions, stop indicated medications  Three Days before Surgery:  Review your medication instructions, stop indicated medications  The Day before Surgery:  Review your medication instructions, stop indicated medications  You will be contacted regarding the time of your arrival to facility and surgery time  Do not eat any food after Midnight  Day of Surgery:  Review your medication instructions, take indicated medications  If you have diabetes, please check your fasting blood sugar upon awakening.  If fasting blood sugar is <80 mg/dl, drink 100 ml of apple juice, time limit of 2 hours before  You may have clear liquids until TWO hours before surgery/procedure.  This includes water, black tea/coffee, (no milk or cream) apple juice and electrolyte drinks (Gatorade)  You may chew gum up to TWO hours before your surgery/procedure  Wear  comfortable loose fitting clothing  Do not use moisturizers, creams, lotions or perfume  All jewelry and valuables should be left at home     CONTACT SURGEON'S OFFICE IF YOU DEVELOP:  * Fever = 100.4 F   * New respiratory symptoms (e.g. cough, shortness of breath, respiratory distress, sore throat)  * Recent loss of taste or smell  *Flu like symptoms such as headache, fatigue or gastrointestinal symptoms  * You develop any open sores, shingles, burning or painful urination   AND/OR:  * You no longer wish to have the surgery.  * Any other personal circumstances change that may lead to the need to cancel or defer this surgery.  *You were admitted to any hospital within one week of your planned procedure.     SMOKING:  *Quitting smoking can make a huge difference to your health and recovery from surgery.    *If you need help with quitting, call 8-800-QUIT-NOW.     THE DAY BEFORE SURGERY:  *Do not eat any food after midnight the night before your surgery.   *You may have up to TEN OUNCES of clear liquids until TWO hours before  your instructed ARRIVAL TIME to hospital. This includes water, black tea/coffee, (no milk or cream) apple juice, clear broth and electrolyte drinks (Gatorade). Please avoid clear liquids that are red in color.   *You may chew gum/mints up to TWO hours before your surgery/procedure.     SURGICAL TIME:  *You will be contacted between 2 p.m. and 3 p.m. the business day before your surgery with your arrival time.  *If you haven't received a call by 3pm, call [phone]  *Scheduled surgery times may change and you will be notified if this occurs-check your personal voicemail for any updates.     ON THE MORNING OF SURGERY:  *Wear comfortable, loose fitting clothing.   *Do not use moisturizers, creams, lotions or perfume.  *All jewelry and valuables should be left at home.  *Prosthetic devices such as contact lenses, hearing aids, dentures, eyelash extensions, hairpins and body piercing must be removed before surgery.     BRING WITH YOU:  *Photo ID and insurance card  *Current list of medications and allergies  *Pacemaker/Defibrillator/Heart stent cards  *CPAP machine and mask  *Slings/splints/crutches  *Copy of your complete Advanced Directive/DHPOA-if applicable  *Neurostimulator implant remote     PARKING AND ARRIVAL:  *Check in at the Main Entrance desk and let them know you are here for surgery.     IF YOU ARE HAVING OUTPATIENT/SAME DAY SURGERY:  *A responsible adult MUST accompany you at the time of discharge and stay with you for 24 hours after your surgery.  *You may NOT drive yourself home after surgery.  *You may use a taxi or ride sharing service ([employer], Uber) to return home ONLY if you are accompanied by a friend or family member.  *Instructions for resuming your medications will be provided by your surgeon.     Thank you for coming to Pre Admission testing.      If I have prescribe medication please don't forget to  at your pharmacy.      Any questions about today's visit call [phone] and leave  a message in the general mailbox.     Patient instructed to ambulate as soon as possible postoperatively to decrease thromboembolic risk.     Erlin Zamudio, APRN-CNP     Thank you for visiting [facility].  If you have any changes to your health condition, please call the surgeons office to alert them and give them details of your symptoms.        Preoperative Fasting Guidelines     Why must I stop eating and drinking near surgery time?  With sedation, food or liquid in your stomach can enter your lungs causing serious complications  Increases nausea and vomiting     When do I need to stop eating and drinking before my surgery?  Do not eat any food after midnight the night before your surgery/procedure.  You may have up to TEN ounces of clear liquid until TWO hours before your instructed arrival time to the hospital.  This includes water, black tea/coffee, (no milk or cream) apple juice, and electrolyte drinks (Gatorade)  You may chew gum until TWO hours before your surgery/procedure        Additional Instructions:      The Day before Surgery:  -Review your medication instructions, stop indicated medications  -You will be contacted in the evening regarding the time of your arrival to facility and surgery time     Day of Surgery:  -Review your medication instructions, take indicated medications  -Wear comfortable loose fitting clothing  -Do not use moisturizers, creams, lotions or perfume  -All jewelry and valuables should be left at home                   Preoperative Brain Exercises     What are brain exercises?  A brain exercise is any activity that engages your thinking (cognitive) skills.     What types of activities are considered brain exercises?  Jigsaw puzzles, crossword puzzles, word jumble, memory games, word search, and many more.  Many can be found free online or on your phone via a mobile dima.     Why should I do brain exercises before my surgery?  More recent research has  shown brain exercise before surgery can lower the risk of postoperative delirium (confusion) which can be especially important for older adults.  Patients who did brain exercises for 5 to 10 hours the days before surgery, cut their risk of postoperative delirium in half up to 1 week after surgery.                         [facility]     Preoperative Deep Breathing Exercises     Why it is important to do deep breathing exercises before my surgery?  Deep breathing exercises strengthen your breathing muscles.  This helps you to recover after your surgery and decreases the chance of breathing complications.        How are the deep breathing exercises done?  Sit straight with your back supported.  Breathe in deeply and slowly through your nose. Your lower rib cage should expand and your abdomen may move forward.  Hold that breath for 3 to 5 seconds.  Breathe out through pursed lips, slowly and completely.  Rest and repeat 10 times every hour while awake.  Rest longer if you become dizzy or lightheaded.                      [facility]     Preoperative Deep Breathing Exercises     Why it is important to do deep breathing exercises before my surgery?  Deep breathing exercises strengthen your breathing muscles.  This helps you to recover after your surgery and decreases the chance of breathing complications.        How are the deep breathing exercises done?  Sit straight with your back supported.  Breathe in deeply and slowly through your nose. Your lower rib cage should expand and your abdomen may move forward.  Hold that breath for 3 to 5 seconds.  Breathe out through pursed lips, slowly and completely.  Rest and repeat 10 times every hour while awake.  Rest longer if you become dizzy or lightheaded.        Patient Information: Incentive Spirometer  What is an incentive spirometer?  An incentive spirometer is a device used before and after surgery to “exercise” your  lungs.  It helps you to take deeper breaths to expand your lungs.  Below is an example of a basic incentive spirometer.  The device you receive may differ slightly but they all function the same.    Why do I need to use an incentive spirometer?  Using your incentive spirometer prepares your lungs for surgery and helps prevent lung problems after surgery.  How do I use my incentive spirometer?  When you're using your incentive spirometer, make sure to breathe through your mouth. If you breathe through your nose, the incentive spirometer won't work properly. You can hold your nose if you have trouble.  If you feel dizzy at any time, stop and rest. Try again at a later time.  Follow the steps below:  Set up your incentive spirometer, expand the flexible tubing and connect to the outlet.  Sit upright in a chair or bed. Hold the incentive spirometer at eye level.   Put the mouthpiece in your mouth and close your lips tightly around it. Slowly breathe out (exhale) completely.  Breathe in (inhale) slowly through your mouth as deeply as you can. As you take a breath, you will see the piston rise inside the large column. While the piston rises, the indicator should move upwards. It should stay in between the 2 arrows (see Figure).  Try to get the piston as high as you can, while keeping the indicator between the arrows.   If the indicator doesn't stay between the arrows, you're breathing either too fast or too slow.  When you get it as high as you can, hold your breath for 10 seconds, or as long as possible. While you're holding your breath, the piston will slowly fall to the base of the spirometer.  Once the piston reaches the bottom of the spirometer, breathe out slowly through your mouth. Rest for a few seconds.  Repeat 10 times. Try to get the piston to the same level with each breath.  Repeat every hour while awake  You can carefully clean the outside of the mouthpiece with an alcohol wipe or soap and water.        Patient and Family Education             Ways You Can Help Prevent Blood Clots                    This handout explains some simple things you can do to help prevent blood clots.      Blood clots are blockages that can form in the body's veins. When a blood clot forms in your deep veins, it may be called a deep vein thrombosis, or DVT for short. Blood clots can happen in any part of the body where blood flows, but they are most common in the arms and legs. If a piece of a blood clot breaks free and travels to the lungs, it is called a pulmonary embolus (PE). A PE can be a very serious problem.         Being in the hospital or having surgery can raise your chances of getting a blood clot because you may not be well enough to move around as much as you normally do.         Ways you can help prevent blood clots in the hospital           Wearing SCDs. SCDs stands for Sequential Compression Devices.   SCDs are special sleeves that wrap around your legs  They attach to a pump that fills them with air to gently squeeze your legs every few minutes.   This helps return the blood in your legs to your heart.   SCDs should only be taken off when walking or bathing.   SCDs may not be comfortable, but they can help save your life.                                            Wearing compression stockings - if your doctor orders them. These special snug fitting stockings gently squeeze your legs to help blood flow.       Walking. Walking helps move the blood in your legs.   If your doctor says it is ok, try walking the halls at least   5 times a day. Ask us to help you get up, so you don't fall.      Taking any blood thinning medicines your doctor orders.        Page 1 of 2            [facility]; 3/23   Ways you can help prevent blood clots at home         Wearing compression stockings - if your doctor orders them. ? Walking - to help move the blood in your legs.       Taking any blood thinning medicines your doctor  orders.      Signs of a blood clot or PE        Tell your doctor or nurse know right away if you have of the problems listed below.    If you are at home, seek medical care right away. Call 911 for chest pain or problems breathing.                Signs of a blood clot (DVT) - such as pain,  swelling, redness or warmth in your arm or leg      Signs of a pulmonary embolism (PE) - such as chest     pain or feeling short of breath

## 2024-07-11 ENCOUNTER — HOSPITAL ENCOUNTER (OUTPATIENT)
Facility: HOSPITAL | Age: 39
Setting detail: OUTPATIENT SURGERY
Discharge: HOME | End: 2024-07-11
Attending: ORTHOPAEDIC SURGERY | Admitting: ORTHOPAEDIC SURGERY
Payer: COMMERCIAL

## 2024-07-11 ENCOUNTER — ANESTHESIA EVENT (OUTPATIENT)
Dept: OPERATING ROOM | Facility: HOSPITAL | Age: 39
End: 2024-07-11
Payer: COMMERCIAL

## 2024-07-11 ENCOUNTER — APPOINTMENT (OUTPATIENT)
Dept: RADIOLOGY | Facility: HOSPITAL | Age: 39
End: 2024-07-11
Payer: COMMERCIAL

## 2024-07-11 ENCOUNTER — ANESTHESIA (OUTPATIENT)
Dept: OPERATING ROOM | Facility: HOSPITAL | Age: 39
End: 2024-07-11
Payer: COMMERCIAL

## 2024-07-11 VITALS
RESPIRATION RATE: 18 BRPM | BODY MASS INDEX: 28.43 KG/M2 | DIASTOLIC BLOOD PRESSURE: 75 MMHG | TEMPERATURE: 97.7 F | WEIGHT: 209.88 LBS | HEIGHT: 72 IN | HEART RATE: 70 BPM | SYSTOLIC BLOOD PRESSURE: 130 MMHG | OXYGEN SATURATION: 97 %

## 2024-07-11 DIAGNOSIS — Z48.89 ENCOUNTER FOR POSTOPERATIVE CARE: Primary | ICD-10-CM

## 2024-07-11 DIAGNOSIS — G89.18 ACUTE POST-OPERATIVE PAIN: Primary | ICD-10-CM

## 2024-07-11 DIAGNOSIS — Z98.890 POST-OPERATIVE STATE: ICD-10-CM

## 2024-07-11 DIAGNOSIS — S29.011A TRAUMATIC RUPTURE OF RIGHT PECTORALIS MAJOR TENDON: Primary | ICD-10-CM

## 2024-07-11 PROBLEM — T88.53XA AWARENESS UNDER ANESTHESIA: Status: ACTIVE | Noted: 2024-07-11

## 2024-07-11 PROBLEM — I10 HTN (HYPERTENSION): Status: ACTIVE | Noted: 2024-07-11

## 2024-07-11 PROCEDURE — 2720000007 HC OR 272 NO HCPCS: Performed by: ORTHOPAEDIC SURGERY

## 2024-07-11 PROCEDURE — 2500000005 HC RX 250 GENERAL PHARMACY W/O HCPCS: Performed by: ANESTHESIOLOGIST ASSISTANT

## 2024-07-11 PROCEDURE — 7100000001 HC RECOVERY ROOM TIME - INITIAL BASE CHARGE: Performed by: ORTHOPAEDIC SURGERY

## 2024-07-11 PROCEDURE — 24341 RPR TDN/MUSC UPR A/E EACH: CPT | Performed by: NURSE PRACTITIONER

## 2024-07-11 PROCEDURE — 7100000009 HC PHASE TWO TIME - INITIAL BASE CHARGE: Performed by: ORTHOPAEDIC SURGERY

## 2024-07-11 PROCEDURE — 2500000005 HC RX 250 GENERAL PHARMACY W/O HCPCS: Performed by: ORTHOPAEDIC SURGERY

## 2024-07-11 PROCEDURE — C1713 ANCHOR/SCREW BN/BN,TIS/BN: HCPCS | Performed by: ORTHOPAEDIC SURGERY

## 2024-07-11 PROCEDURE — A24341 PR MUSC/TENDON REPAIR EACH; ARM/ELBOW: Performed by: ANESTHESIOLOGIST ASSISTANT

## 2024-07-11 PROCEDURE — A24341 PR MUSC/TENDON REPAIR EACH; ARM/ELBOW: Performed by: ANESTHESIOLOGY

## 2024-07-11 PROCEDURE — 2500000004 HC RX 250 GENERAL PHARMACY W/ HCPCS (ALT 636 FOR OP/ED): Performed by: ANESTHESIOLOGY

## 2024-07-11 PROCEDURE — 7100000010 HC PHASE TWO TIME - EACH INCREMENTAL 1 MINUTE: Performed by: ORTHOPAEDIC SURGERY

## 2024-07-11 PROCEDURE — 2500000004 HC RX 250 GENERAL PHARMACY W/ HCPCS (ALT 636 FOR OP/ED): Performed by: ANESTHESIOLOGIST ASSISTANT

## 2024-07-11 PROCEDURE — 2500000004 HC RX 250 GENERAL PHARMACY W/ HCPCS (ALT 636 FOR OP/ED): Performed by: NURSE PRACTITIONER

## 2024-07-11 PROCEDURE — 64415 NJX AA&/STRD BRCH PLXS IMG: CPT | Performed by: ANESTHESIOLOGY

## 2024-07-11 PROCEDURE — 3600000008 HC OR TIME - EACH INCREMENTAL 1 MINUTE - PROCEDURE LEVEL THREE: Performed by: ORTHOPAEDIC SURGERY

## 2024-07-11 PROCEDURE — 2780000003 HC OR 278 NO HCPCS: Performed by: ORTHOPAEDIC SURGERY

## 2024-07-11 PROCEDURE — 3700000002 HC GENERAL ANESTHESIA TIME - EACH INCREMENTAL 1 MINUTE: Performed by: ORTHOPAEDIC SURGERY

## 2024-07-11 PROCEDURE — 24341 RPR TDN/MUSC UPR A/E EACH: CPT | Performed by: ORTHOPAEDIC SURGERY

## 2024-07-11 PROCEDURE — 3700000001 HC GENERAL ANESTHESIA TIME - INITIAL BASE CHARGE: Performed by: ORTHOPAEDIC SURGERY

## 2024-07-11 PROCEDURE — 3600000003 HC OR TIME - INITIAL BASE CHARGE - PROCEDURE LEVEL THREE: Performed by: ORTHOPAEDIC SURGERY

## 2024-07-11 PROCEDURE — 7100000002 HC RECOVERY ROOM TIME - EACH INCREMENTAL 1 MINUTE: Performed by: ORTHOPAEDIC SURGERY

## 2024-07-11 DEVICE — KIT, IMPLANT SYSTEM, PROXIMAL TENODESIS: Type: IMPLANTABLE DEVICE | Site: SHOULDER | Status: FUNCTIONAL

## 2024-07-11 RX ORDER — PROPOFOL 10 MG/ML
INJECTION, EMULSION INTRAVENOUS AS NEEDED
Status: DISCONTINUED | OUTPATIENT
Start: 2024-07-11 | End: 2024-07-11

## 2024-07-11 RX ORDER — ASPIRIN 81 MG/1
81 TABLET ORAL DAILY
Qty: 14 TABLET | Refills: 0 | Status: SHIPPED | OUTPATIENT
Start: 2024-07-11 | End: 2024-07-25

## 2024-07-11 RX ORDER — ROCURONIUM BROMIDE 10 MG/ML
INJECTION, SOLUTION INTRAVENOUS AS NEEDED
Status: DISCONTINUED | OUTPATIENT
Start: 2024-07-11 | End: 2024-07-11

## 2024-07-11 RX ORDER — ONDANSETRON HYDROCHLORIDE 2 MG/ML
4 INJECTION, SOLUTION INTRAVENOUS ONCE AS NEEDED
Status: COMPLETED | OUTPATIENT
Start: 2024-07-11 | End: 2024-07-11

## 2024-07-11 RX ORDER — OXYCODONE HYDROCHLORIDE 5 MG/1
5 TABLET ORAL EVERY 4 HOURS PRN
Status: DISCONTINUED | OUTPATIENT
Start: 2024-07-11 | End: 2024-07-11 | Stop reason: HOSPADM

## 2024-07-11 RX ORDER — HYDROMORPHONE HYDROCHLORIDE 0.2 MG/ML
0.2 INJECTION INTRAMUSCULAR; INTRAVENOUS; SUBCUTANEOUS EVERY 5 MIN PRN
Status: DISCONTINUED | OUTPATIENT
Start: 2024-07-11 | End: 2024-07-11 | Stop reason: HOSPADM

## 2024-07-11 RX ORDER — HYDRALAZINE HYDROCHLORIDE 20 MG/ML
5 INJECTION INTRAMUSCULAR; INTRAVENOUS EVERY 30 MIN PRN
Status: DISCONTINUED | OUTPATIENT
Start: 2024-07-11 | End: 2024-07-11 | Stop reason: HOSPADM

## 2024-07-11 RX ORDER — MIDAZOLAM HYDROCHLORIDE 1 MG/ML
2 INJECTION, SOLUTION INTRAMUSCULAR; INTRAVENOUS ONCE
Status: COMPLETED | OUTPATIENT
Start: 2024-07-11 | End: 2024-07-11

## 2024-07-11 RX ORDER — ALBUTEROL SULFATE 0.83 MG/ML
2.5 SOLUTION RESPIRATORY (INHALATION) ONCE AS NEEDED
Status: DISCONTINUED | OUTPATIENT
Start: 2024-07-11 | End: 2024-07-11 | Stop reason: HOSPADM

## 2024-07-11 RX ORDER — KETOROLAC TROMETHAMINE 10 MG/1
10 TABLET, FILM COATED ORAL EVERY 6 HOURS PRN
Qty: 12 TABLET | Refills: 0 | Status: SHIPPED | OUTPATIENT
Start: 2024-07-11 | End: 2024-07-14

## 2024-07-11 RX ORDER — MEPERIDINE HYDROCHLORIDE 25 MG/ML
12.5 INJECTION INTRAMUSCULAR; INTRAVENOUS; SUBCUTANEOUS EVERY 10 MIN PRN
Status: DISCONTINUED | OUTPATIENT
Start: 2024-07-11 | End: 2024-07-11 | Stop reason: HOSPADM

## 2024-07-11 RX ORDER — ONDANSETRON 4 MG/1
4 TABLET, FILM COATED ORAL EVERY 6 HOURS PRN
Qty: 20 TABLET | Refills: 0 | Status: SHIPPED | OUTPATIENT
Start: 2024-07-11 | End: 2024-07-17

## 2024-07-11 RX ORDER — FENTANYL CITRATE 50 UG/ML
25 INJECTION, SOLUTION INTRAMUSCULAR; INTRAVENOUS EVERY 5 MIN PRN
Status: DISCONTINUED | OUTPATIENT
Start: 2024-07-11 | End: 2024-07-11 | Stop reason: HOSPADM

## 2024-07-11 RX ORDER — MIDAZOLAM HYDROCHLORIDE 1 MG/ML
INJECTION, SOLUTION INTRAMUSCULAR; INTRAVENOUS AS NEEDED
Status: DISCONTINUED | OUTPATIENT
Start: 2024-07-11 | End: 2024-07-11

## 2024-07-11 RX ORDER — DOCUSATE SODIUM 100 MG/1
100 CAPSULE, LIQUID FILLED ORAL 2 TIMES DAILY
Qty: 30 CAPSULE | Refills: 0 | Status: SHIPPED | OUTPATIENT
Start: 2024-07-11 | End: 2024-07-26

## 2024-07-11 RX ORDER — SODIUM CHLORIDE 0.9 G/100ML
IRRIGANT IRRIGATION AS NEEDED
Status: DISCONTINUED | OUTPATIENT
Start: 2024-07-11 | End: 2024-07-11 | Stop reason: HOSPADM

## 2024-07-11 RX ORDER — CEFAZOLIN SODIUM 2 G/100ML
2 INJECTION, SOLUTION INTRAVENOUS ONCE
Status: COMPLETED | OUTPATIENT
Start: 2024-07-11 | End: 2024-07-11

## 2024-07-11 RX ORDER — OXYCODONE AND ACETAMINOPHEN 5; 325 MG/1; MG/1
1 TABLET ORAL EVERY 6 HOURS PRN
Qty: 24 TABLET | Refills: 0 | Status: SHIPPED | OUTPATIENT
Start: 2024-07-11 | End: 2024-07-17

## 2024-07-11 RX ORDER — METOCLOPRAMIDE HYDROCHLORIDE 5 MG/ML
10 INJECTION INTRAMUSCULAR; INTRAVENOUS ONCE
Status: COMPLETED | OUTPATIENT
Start: 2024-07-11 | End: 2024-07-11

## 2024-07-11 RX ORDER — OMEPRAZOLE 40 MG/1
40 CAPSULE, DELAYED RELEASE ORAL
Qty: 3 CAPSULE | Refills: 0 | Status: SHIPPED | OUTPATIENT
Start: 2024-07-11 | End: 2024-07-14

## 2024-07-11 RX ORDER — METOCLOPRAMIDE 10 MG/1
10 TABLET ORAL ONCE
Status: COMPLETED | OUTPATIENT
Start: 2024-07-11 | End: 2024-07-11

## 2024-07-11 RX ORDER — FENTANYL CITRATE 50 UG/ML
50 INJECTION, SOLUTION INTRAMUSCULAR; INTRAVENOUS ONCE
Status: COMPLETED | OUTPATIENT
Start: 2024-07-11 | End: 2024-07-11

## 2024-07-11 RX ORDER — TRANEXAMIC ACID 100 MG/ML
INJECTION, SOLUTION INTRAVENOUS AS NEEDED
Status: DISCONTINUED | OUTPATIENT
Start: 2024-07-11 | End: 2024-07-11

## 2024-07-11 RX ORDER — ONDANSETRON HYDROCHLORIDE 2 MG/ML
INJECTION, SOLUTION INTRAVENOUS AS NEEDED
Status: DISCONTINUED | OUTPATIENT
Start: 2024-07-11 | End: 2024-07-11

## 2024-07-11 RX ORDER — FENTANYL CITRATE 50 UG/ML
INJECTION, SOLUTION INTRAMUSCULAR; INTRAVENOUS AS NEEDED
Status: DISCONTINUED | OUTPATIENT
Start: 2024-07-11 | End: 2024-07-11

## 2024-07-11 RX ORDER — IPRATROPIUM BROMIDE 0.5 MG/2.5ML
500 SOLUTION RESPIRATORY (INHALATION) ONCE
Status: DISCONTINUED | OUTPATIENT
Start: 2024-07-11 | End: 2024-07-11 | Stop reason: HOSPADM

## 2024-07-11 RX ORDER — DIPHENHYDRAMINE HYDROCHLORIDE 50 MG/ML
12.5 INJECTION INTRAMUSCULAR; INTRAVENOUS ONCE AS NEEDED
Status: DISCONTINUED | OUTPATIENT
Start: 2024-07-11 | End: 2024-07-11 | Stop reason: HOSPADM

## 2024-07-11 RX ORDER — LIDOCAINE HYDROCHLORIDE 10 MG/ML
INJECTION, SOLUTION EPIDURAL; INFILTRATION; INTRACAUDAL; PERINEURAL AS NEEDED
Status: DISCONTINUED | OUTPATIENT
Start: 2024-07-11 | End: 2024-07-11

## 2024-07-11 RX ORDER — SODIUM CHLORIDE, SODIUM LACTATE, POTASSIUM CHLORIDE, CALCIUM CHLORIDE 600; 310; 30; 20 MG/100ML; MG/100ML; MG/100ML; MG/100ML
50 INJECTION, SOLUTION INTRAVENOUS CONTINUOUS
Status: DISCONTINUED | OUTPATIENT
Start: 2024-07-11 | End: 2024-07-11 | Stop reason: HOSPADM

## 2024-07-11 RX ADMIN — ONDANSETRON 4 MG: 2 INJECTION INTRAMUSCULAR; INTRAVENOUS at 12:40

## 2024-07-11 RX ADMIN — FENTANYL CITRATE 25 MCG: 50 INJECTION INTRAMUSCULAR; INTRAVENOUS at 12:35

## 2024-07-11 RX ADMIN — METOCLOPRAMIDE 10 MG: 5 INJECTION, SOLUTION INTRAMUSCULAR; INTRAVENOUS at 14:11

## 2024-07-11 RX ADMIN — MIDAZOLAM 2 MG: 1 INJECTION INTRAMUSCULAR; INTRAVENOUS at 09:29

## 2024-07-11 RX ADMIN — FENTANYL CITRATE 50 MCG: 50 INJECTION INTRAMUSCULAR; INTRAVENOUS at 09:29

## 2024-07-11 RX ADMIN — FENTANYL CITRATE 25 MCG: 50 INJECTION INTRAMUSCULAR; INTRAVENOUS at 12:30

## 2024-07-11 SDOH — HEALTH STABILITY: MENTAL HEALTH: CURRENT SMOKER: 0

## 2024-07-11 ASSESSMENT — PAIN SCALES - GENERAL
PAINLEVEL_OUTOF10: 0 - NO PAIN
PAINLEVEL_OUTOF10: 0 - NO PAIN
PAIN_LEVEL: 0
PAINLEVEL_OUTOF10: 0 - NO PAIN
PAINLEVEL_OUTOF10: 0 - NO PAIN
PAINLEVEL_OUTOF10: 3
PAINLEVEL_OUTOF10: 5 - MODERATE PAIN
PAINLEVEL_OUTOF10: 0 - NO PAIN
PAINLEVEL_OUTOF10: 3
PAINLEVEL_OUTOF10: 6
PAINLEVEL_OUTOF10: 3
PAINLEVEL_OUTOF10: 4
PAINLEVEL_OUTOF10: 0 - NO PAIN

## 2024-07-11 ASSESSMENT — PAIN - FUNCTIONAL ASSESSMENT
PAIN_FUNCTIONAL_ASSESSMENT: 0-10

## 2024-07-11 ASSESSMENT — COLUMBIA-SUICIDE SEVERITY RATING SCALE - C-SSRS
1. IN THE PAST MONTH, HAVE YOU WISHED YOU WERE DEAD OR WISHED YOU COULD GO TO SLEEP AND NOT WAKE UP?: NO
2. HAVE YOU ACTUALLY HAD ANY THOUGHTS OF KILLING YOURSELF?: NO

## 2024-07-11 NOTE — ANESTHESIA PROCEDURE NOTES
Peripheral Block    Patient location during procedure: pre-op  Start time: 7/11/2024 9:33 AM  End time: 7/11/2024 9:35 AM  Reason for block: at surgeon's request and post-op pain management  Staffing  Performed: attending   Authorized by: Luke Faye MD    Performed by: Luke Faye MD  Preanesthetic Checklist  Completed: patient identified, IV checked, site marked, risks and benefits discussed, surgical consent, monitors and equipment checked, pre-op evaluation and timeout performed   Timeout performed at: 7/11/2024 9:28 AM  Peripheral Block  Patient position: laying flat  Prep: ChloraPrep  Patient monitoring: heart rate, cardiac monitor and continuous pulse ox  Block type: interscalene  Laterality: right  Injection technique: single-shot  Guidance: ultrasound guided  Needle  Needle type: short-bevel   Needle gauge: 22 G  Needle length: 5 cm  Needle localization: ultrasound guidance     image stored in chart  Test dose: negative  Assessment  Injection assessment: no paresthesia on injection, negative aspiration for heme, incremental injection and local visualized surrounding nerve on ultrasound  Paresthesia pain: none  Heart rate change: no  Slow fractionated injection: yes  Additional Notes  Ropivicaine 20 ml 0.5% with 5 mg decadron preservative free.    Patient able to flex and extend hand post nerve block.    Ultrasound guidance used-- able to visualize needle, Nerve roots and local anesthetic  filling around the nerve roots as it is injected.

## 2024-07-11 NOTE — ANESTHESIA POSTPROCEDURE EVALUATION
"Patient: Fran Garcia \"Scott\"    Procedure Summary       Date: 07/11/24 Room / Location: [facility] / [facility]    Anesthesia Start: 1007 Anesthesia Stop: 1225    Procedure: Right pectoralis repair ( Arthrex Buttons and Anchors, Beach Chair ) (Right: Chest) Diagnosis:       Traumatic rupture of right pectoralis major tendon      (Traumatic rupture of right pectoralis major tendon [S29.011A])    Surgeons: Ti Bravo MD Responsible Provider: Luke Faye MD    Anesthesia Type: general, regional ASA Status: 2            Anesthesia Type: general, regional    Vitals Value Taken Time   /86 07/11/24 1236   Temp 36.3 °C (97.3 °F) 07/11/24 1221   Pulse 75 07/11/24 1236   Resp 18 07/11/24 1236   SpO2 95 % 07/11/24 1236       Anesthesia Post Evaluation    Patient location during evaluation: PACU  Patient participation: complete - patient participated  Level of consciousness: awake  Pain score: 0  Pain management: adequate  Multimodal analgesia pain management approach  Airway patency: patent  Two or more strategies used to mitigate risk of obstructive sleep apnea  Cardiovascular status: acceptable  Respiratory status: acceptable  Hydration status: acceptable  Postoperative Nausea and Vomiting: none        There were no known notable events for this encounter.    "

## 2024-07-11 NOTE — PERIOPERATIVE NURSING NOTE
Pt doing well in recovery. Pt tolerates po well no. Pt with pain is tolerable. Pt now with improved nausea. Pt ready for phase 2.

## 2024-07-11 NOTE — ANESTHESIA PREPROCEDURE EVALUATION
"Patient: Fran Garcia \"Scott\"    Procedure Information       Date/Time: 07/11/24 0910    Procedure: Right pectoralis repair ( Arthrex Buttons and Anchors, Beach Chair ) (Right: Chest)    Location: [facility] / [facility]    Surgeons: Ti Bravo MD            Relevant Problems   Anesthesia   (+) Awareness under anesthesia      Cardiac   (+) HTN (hypertension)      Pulmonary (within normal limits)      Neuro (within normal limits)      GI (within normal limits)      /Renal (within normal limits)      Liver (within normal limits)      Endocrine (within normal limits)      Hematology (within normal limits)      Musculoskeletal (within normal limits)      HEENT (within normal limits)      ID (within normal limits)      Skin (within normal limits)      GYN (within normal limits)       Clinical information reviewed:               No Known Allergies  Prior to Admission medications    Medication Sig Start Date End Date Taking? Authorizing Provider   acetaminophen (Tylenol 8 HOUR) 650 mg ER tablet Take 1 tablet (650 mg) by mouth every 8 hours if needed for mild pain (1 - 3). Do not crush, chew, or split.    Historical Provider, MD   amphetamine-dextroamphetamine (Adderall) 5 mg tablet Take 1 tablet (5 mg) by mouth once daily at bedtime. 6/12/24   Historical Provider, MD   gabapentin (Neurontin) 300 mg capsule Take 1 capsule (300 mg) by mouth 3 times a day. 6/12/24   Historical Provider, MD   ibuprofen 600 mg tablet Take 1 tablet (600 mg) by mouth every 6 hours if needed for mild pain (1 - 3).    Historical Provider, MD   lamoTRIgine (LaMICtal) 100 mg tablet Take 1 tablet (100 mg) by mouth every 12 hours. 4/1/24   Historical Provider, MD   LORazepam (Ativan) 0.5 mg tablet Take 1 tablet (0.5 mg) by mouth every 6 hours if needed for anxiety.    Historical Provider, MD   multivitamin tablet Take 1 tablet by mouth once daily.    Historical Provider, MD   olmesartan (BENIcar) 20 mg tablet Take 1 tablet (20 mg) by mouth " once daily. 11/12/23   Santhosh Hoff MD     Past Medical History:   Diagnosis Date    Awareness under anesthesia     Fracture of unspecified carpal bone, unspecified wrist, initial encounter for closed fracture     Wrist fracture    Hypertension      Past Surgical History:   Procedure Laterality Date    APPENDECTOMY  05/27/2015    Appendectomy         NPO Detail:  No data recorded     Physical Exam    Airway  Mallampati: II  TM distance: >3 FB  Neck ROM: full     Cardiovascular    Dental    Pulmonary    Abdominal            Anesthesia Plan    History of general anesthesia?: yes  History of complications of general anesthesia?: no    ASA 2     general and regional   (Interscalene nerve block)  The patient is not a current smoker.  Patient was not previously instructed to abstain from smoking on day of procedure.  Patient did not smoke on day of procedure.  Education provided regarding risk of obstructive sleep apnea.  intravenous induction   Anesthetic plan and risks discussed with patient.    Plan discussed with CRNA and CAA.

## 2024-07-11 NOTE — OP NOTE
"Right pectoralis repair ( Arthrex Buttons and Anchors, Beach Chair ) (R) Operative Note     Date: 2024  OR Location: [facility]    Name: Fran Garcia \"Scott\", : 1985, Age: 39 y.o., MRN: [id number], Sex: male    Diagnosis  Pre-op Diagnosis      * Traumatic rupture of right pectoralis major tendon [S29.011A] Post-op Diagnosis     * Traumatic rupture of right pectoralis major tendon [S29.011A]     Procedures  Right pectoralis repair ( Arthrex Buttons and Anchors, Beach Chair )  [id number] - [place] REPAIR TENDON/MUSCLE UPPER ARM/ELBOW EA TDN[place] [place]      Surgeons      * Ti Bravo - Primary    Resident/Fellow/Other Assistant:  Surgeons and Role:     * CHETAN Brito-CNP - MATHEW First Assist    Procedure Summary  Anesthesia: Regional, General  ASA: II  Anesthesia Staff: Anesthesiologist: Luke Faye MD  C-AA: BRENTON Benites  Estimated Blood Loss: 50 mL  Intra-op Medications:   Administrations occurring from 0910 to 1110 on 24:   Medication Name Total Dose   sodium chloride 0.9 % irrigation solution 1,000 mL   lactated Ringer's infusion Cannot be calculated   ceFAZolin (Ancef) 2 g in dextrose (iso)  mL 2 g   fentaNYL PF (Sublimaze) injection 50 mcg 50 mcg   midazolam (Versed) injection 2 mg 2 mg              Anesthesia Record               Intraprocedure I/O Totals          Intake    Tranexamic Acid 0.00 mL    The total shown is the total volume documented since Anesthesia Start was filed.    ceFAZolin (Ancef) 2 g in dextrose (iso)  mL 100.00 mL    Total Intake 100 mL       Output    Est. Blood Loss 50 mL    Total Output 50 mL       Net    Net Volume 50 mL          Specimen: No specimens collected     Staff:   Circulator: Yari  Scrub Person: Catalina Rodriguez Circulator: Bina         Drains and/or Catheters: * None in log *    Tourniquet Times:         Implants:  Implants       Type Name Action Serial No.      Implant KIT, IMPLANT SYSTEM, PROXIMAL TENODESIS - [id number] Implanted      " Implant KIT, IMPLANT SYSTEM, PROXIMAL TENODESIS - [id number] Implanted               Findings: Consistent with diagnosis patient had a full-thickness pectoralis major repair including the sternal and clavicular heads retracted to the level of the chest wall    Indications: Scott Garcia is an 39 y.o. male who is having surgery for Traumatic rupture of right pectoralis major tendon [S29.011A].     The patient was seen in the preoperative area. The risks, benefits, complications, treatment options, non-operative alternatives, expected recovery and outcomes were discussed with the patient. The possibilities of reaction to medication, pulmonary aspiration, injury to surrounding structures, bleeding, recurrent infection, the need for additional procedures, failure to diagnose a condition, and creating a complication requiring transfusion or operation were discussed with the patient. The patient concurred with the proposed plan, giving informed consent.  The site of surgery was properly noted/marked if necessary per policy. The patient has been actively warmed in preoperative area. Preoperative antibiotics have been ordered and given within 1 hours of incision. Venous thrombosis prophylaxis have been ordered including bilateral sequential compression devices    Procedure Details: Briefly this is a very pleasant gentleman who was changing a tire and felt a pop in his shoulder.  MRI of the chest revealed a full-thickness pectoralis major rupture.  Occurred almost 4 weeks ago.  Risk-benefit alternatives of surgery including infection bleeding nerve injury rerupture and only having 95% of all functional strength of the shoulder were all discussed at length.  Patient understood these risks and wished to proceed.  The day of surgery patient was seen in the preop holding area of the shoulder was identified as correct operative site and was marked as such.  Consent was signed interscalene nerve block was performed patient was taken  to the operating room to [facility].  Performed a huddle.  Patient was correct patient shoulder was correct operative site antibiotics and allergies were reviewed.  Following this patient was sedated intubated.  Positioned in beachchair position all bony prominences were well-padded.  Prepped draped standard fashion.  Made a standard slightly medial deltopectoral incision.  We went down.  Genfiber the medial border of the deltoid.  The pectoralis major had completely ruptured with the exception of some very small wisps of tissue.  This was mobilized in standard fashion blunt mobilization along the chest wall and superficially were performed.  3 [place] sutures using suture tape were used.  We used 3 biceps buttons these were flipped in standard fashion and tensioned down.  This really nicely repeat created our axillary anterior fold.  Reduced the pectoralis in a tension slide technique back to the lateral side of the bicipital groove.  Copiously irrigated the wound.  Got 20 degrees external rotation without tension.  Biceps was able to move freely.  Close the deltopectoral interval with 0 Vicryl 3-0 Vicryl Monocryl and Dermabond completed the closure.  Patient was placed in sling and will remain seated and transferred to PACU where there recovering  Please note that we will be billing for my nurse practitioner's first assist as she was critical in the center for successful completion of the case including positioning of the body, retraction, suture management, placement of the implants and wound closure. There was no qualified resident available for the entire case    Complications:  None; patient tolerated the procedure well.    Disposition: PACU - hemodynamically stable.  Condition: stable         Additional Details: None patient will be in a sling elbow wrist range of motion no external rotation no pushing or pulling for 4 weeks    Attending Attestation: I was present and scrubbed for the key portions of the  procedure.    Ti Bravo  Phone Number: [phone]

## 2024-07-11 NOTE — ANESTHESIA PROCEDURE NOTES
Airway  Date/Time: 7/11/2024 10:16 AM  Urgency: elective    Airway not difficult    Staffing  Performed: BRENTON   Authorized by: Luke Faye MD    Performed by: BRENTON Benites  Patient location during procedure: OR    Indications and Patient Condition  Indications for airway management: anesthesia and airway protection  Spontaneous Ventilation: absent  Sedation level: deep  Preoxygenated: yes  Patient position: sniffing  MILS maintained throughout  Mask difficulty assessment: 1 - vent by mask  Planned trial extubation    Final Airway Details  Final airway type: endotracheal airway      Successful airway: ETT  Cuffed: yes   Successful intubation technique: direct laryngoscopy  Facilitating devices/methods: intubating stylet  Endotracheal tube insertion site: oral  Blade: Elias  Blade size: #3  ETT size (mm): 8.0  Cormack-Lehane Classification: grade I - full view of glottis  Placement verified by: chest auscultation, capnometry and palpation of cuff   Measured from: lips  ETT to lips (cm): 22  Number of attempts at approach: 1

## 2024-07-15 DIAGNOSIS — Z98.890 STATUS POST REPAIR OF PECTORALIS MUSCLE TEAR: Primary | ICD-10-CM

## 2024-07-23 ENCOUNTER — OFFICE VISIT (OUTPATIENT)
Dept: ORTHOPEDIC SURGERY | Facility: HOSPITAL | Age: 39
End: 2024-07-23
Payer: COMMERCIAL

## 2024-07-23 VITALS — HEIGHT: 72 IN | BODY MASS INDEX: 28.99 KG/M2 | WEIGHT: 214 LBS

## 2024-07-23 DIAGNOSIS — S29.011A TRAUMATIC RUPTURE OF RIGHT PECTORALIS MAJOR TENDON: Primary | ICD-10-CM

## 2024-07-23 PROCEDURE — 1036F TOBACCO NON-USER: CPT | Performed by: NURSE PRACTITIONER

## 2024-07-23 PROCEDURE — 99211 OFF/OP EST MAY X REQ PHY/QHP: CPT | Performed by: NURSE PRACTITIONER

## 2024-07-23 PROCEDURE — 3008F BODY MASS INDEX DOCD: CPT | Performed by: NURSE PRACTITIONER

## 2024-07-23 ASSESSMENT — PAIN - FUNCTIONAL ASSESSMENT: PAIN_FUNCTIONAL_ASSESSMENT: NO/DENIES PAIN

## 2024-07-23 NOTE — PROGRESS NOTES
Provider Impression/Assessment:  Fran Garcia is 2 weeks status post right pec repair done on 7/11/24.     Patient Discussion/Plan:  For the next 2 weeks, they are to do elbow and wrist range of motion exercises that have been discussed with them. These should be done 5-6 times a day. We talked about scapular stabilizing exercises that they can do right now as well. No lifting more than a coffee cup for the next 6 weeks. No torquing motions. No opening or closing doors. It is highly encouraged that the sling should be worn the majority of time over the next 2 weeks, while in and out of the house. It should be worn with sleeping as well. In 2 weeks time, they will initiate physical therapy for range of motion exercises and discard the sling. A prescription for therapy was given today as well as [facility] locations to get therapy completed. We will likely allow them to progress to strengthening at 3 months.     We discussed compliance and not overdoing it. Patient should be seen again in clinic in 6 weeks for a repeat clinical check. No repeat xrays needed at that time.     All questions were answered today and they are comfortable with the above plan.     The patient was seen and evaluated by Dr. Bravo today. The history, physical exam, explanation of the disorder, and discussion of treatment options was performed by Dr. Bravo with the patient.      Should you have any questions or concerns after your visit today, please do not hesitate to call the office at [phone]. We strive to give you high-quality, patient-centered, collaborative care and I am honored to be a part of your health care team.      -------------------------------------------------------------------------------------------------  CHIEF COMPLAINT:  POV: RIGHT PEC REPAIR  DOS: 7/11/24    History of Present Illness:  Fran Garcia is a pleasant 39 y.o. male who returns to clinic for their first postoperative visit. They are 12 days S/P right  pectoralis repair, done 7/11/24. Sutures removed today.     Fran Garcia reports doing well after surgery. Pain is well managed. Denies use of narcotic pain medication at this time. Over-the-counter pain medication at this time. Using ice machine daily. Sleeping okay. Denies redness or warmth at incision site. Denies any fever, chills, or paresthesia. They have been compliant with restrictions. Presents today wearing their shoulder sling. Doing well with daily home therapy for passive and active elbow, wrist and hand exercises.     Review of Systems:   Review of systems for all 14 systems is negative for complaint except for the above noted findings in the history of present illness.    Family, social, and medical histories are obtained and reviewed.    Allergies:  No Known Allergies    Medications:    Current Outpatient Medications:     acetaminophen (Tylenol 8 HOUR) 650 mg ER tablet, Take 1 tablet (650 mg) by mouth every 8 hours if needed for mild pain (1 - 3). Do not crush, chew, or split., Disp: , Rfl:     amphetamine-dextroamphetamine (Adderall) 5 mg tablet, Take 1 tablet (5 mg) by mouth once daily at bedtime., Disp: , Rfl:     docusate sodium (Colace) 100 mg capsule, Take 1 capsule (100 mg) by mouth 2 times a day for 15 days., Disp: 30 capsule, Rfl: 0    gabapentin (Neurontin) 300 mg capsule, Take 1 capsule (300 mg) by mouth 3 times a day., Disp: , Rfl:     ibuprofen 600 mg tablet, Take 1 tablet (600 mg) by mouth every 6 hours if needed for mild pain (1 - 3)., Disp: , Rfl:     lamoTRIgine (LaMICtal) 100 mg tablet, Take 1 tablet (100 mg) by mouth every 12 hours., Disp: , Rfl:     LORazepam (Ativan) 0.5 mg tablet, Take 1 tablet (0.5 mg) by mouth every 6 hours if needed for anxiety., Disp: , Rfl:     multivitamin tablet, Take 1 tablet by mouth once daily., Disp: , Rfl:     olmesartan (BENIcar) 20 mg tablet, Take 1 tablet (20 mg) by mouth once daily., Disp: 90 tablet, Rfl: 3    omeprazole (PriLOSEC) 40 mg   capsule, Take 1 capsule (40 mg) by mouth once daily in the morning. Take before meals for 3 days. Do not crush or chew., Disp: 3 capsule, Rfl: 0    Diagnoses/Problems:  Right pectoralis rupture    Physical Examination:  Patient portal shoulder incisions and open incision are dry, intact and healing well. Minimal swelling. No warmth or erythema. No ecchymosis. Sutures were removed today. Neurovascularly intact distally. 5 out of 5 wrist, hand and thumb flexion and extension without pain. Full active range of motion of elbow      *While intending to generate a timely document that accurately reflects the content of the visit, no guarantee can be provided that every grammatical or spelling mistake has been or will be identified or corrected. Thank you for your understanding.

## 2024-08-08 ENCOUNTER — EVALUATION (OUTPATIENT)
Dept: PHYSICAL THERAPY | Facility: CLINIC | Age: 39
End: 2024-08-08
Payer: COMMERCIAL

## 2024-08-08 DIAGNOSIS — S29.011D PECTORALIS MUSCLE RUPTURE, SUBSEQUENT ENCOUNTER: ICD-10-CM

## 2024-08-08 PROCEDURE — 97110 THERAPEUTIC EXERCISES: CPT | Mod: GP | Performed by: PHYSICAL THERAPIST

## 2024-08-08 PROCEDURE — 97161 PT EVAL LOW COMPLEX 20 MIN: CPT | Mod: GP | Performed by: PHYSICAL THERAPIST

## 2024-08-08 PROCEDURE — 97140 MANUAL THERAPY 1/> REGIONS: CPT | Mod: GP | Performed by: PHYSICAL THERAPIST

## 2024-08-08 ASSESSMENT — ENCOUNTER SYMPTOMS
LOSS OF SENSATION IN FEET: 0
DEPRESSION: 0
OCCASIONAL FEELINGS OF UNSTEADINESS: 0

## 2024-08-08 ASSESSMENT — PATIENT HEALTH QUESTIONNAIRE - PHQ9
SUM OF ALL RESPONSES TO PHQ9 QUESTIONS 1 AND 2: 0
1. LITTLE INTEREST OR PLEASURE IN DOING THINGS: NOT AT ALL
2. FEELING DOWN, DEPRESSED OR HOPELESS: NOT AT ALL

## 2024-08-08 NOTE — PROGRESS NOTES
"  Physical Therapy  Physical Therapy Orthopedic Evaluation    Patient Name: Fran Garcia \"Scott\"  MRN: [id number]  Today's Date: 8/8/2024  Time Calculation  Start Time: 1645  Stop Time: 1730  Time Calculation (min): 45 min    PT Evaluation Time Entry  PT Evaluation (Low) Time Entry: 15  PT Therapeutic Procedures Time Entry  Manual Therapy Time Entry: 10  Therapeutic Exercise Time Entry: 13       Insurance:  Visit number: 1 of [place]  Authorization info: No auth  Insurance Type: Payor: GENERIC COMMERCIAL / Plan: GENERIC COMMERCIAL / Product Type: *No Product type* /      Current Problem  1. Pectoralis muscle rupture, subsequent encounter  Referral to Physical Therapy          Surgery:7/11/24 R shoulder Pec Repair    Referred by: Molly Jensen PA-C    Precautions:     Medical History Form: Reviewed (scanned into chart)  Reviewed medical form, [place], [place] risk, Gnosticist beliefs, PHQ    Subjective:   Subjective   Chief Complaint: R Pec surgery/Repair  Onset: June 2024  VERONICA: Helping Plug a tire    General:   Patient opted for surgery secondary to positive MRI imaging  Current Condition:   Better    Pain:     Location: R Pec  Rating: Best-0, Current-0, Worst-Mild   Description: dull ache   Aggravating Factors:  gentle movement   Relieving Factors:  mobility and icing    Relevant Information (PMH & Previous Tests/Imaging): + imaging prior to surgery    Previous Interventions/Treatments: None    Prior Level of Function (PLOF)  Patient previously independent with all ADLs  Exercise/Physical Activity: active with children   Work/School:      Patients Living Environment: Reviewed and no concern  Primary Language: English  Patient's Goal(s) for Therapy: improve mobility, strength, and function    Red Flags: Do you have any of the following? No  Fever/chills, unexplained weight changes, dizziness/fainting, unexplained change in bowel or bladder functions, unexplained malaise or muscle weakness, night pain/sweats, " numbness or tingling    Objective:  Objective     Palpation/Observation  Mild soft tissue tightness medial aspect pectoralis major  Posture  Presents with shoulder sling  Special Testing  No special testing   ROM  8/8/2024  Right shoulder  Flexion 90 degrees open and feel, internal rotation to the abdomen/neutral, external rotation 20 degrees open end feel, no pain throughout.  Patient has full range of motion wrist hand elbow  Strength  8/8/2024  3/5 wrist hand elbow no resistance applied  Sensation  Full sensation  Reflexes      Transfers: Within normal limits  Gait: Within normal limits  Functional Reach test ER NT  Functional IR behind the back NT    Outcome Measures:  Other Measures  Other Outcome Measures: 100% dash   8/8/2024  Treatments:  Ther-EX:  - Supine Shoulder Flexion with Dowel to 90  - 1-2 x daily - 3 sets - 10 reps  - Flexion-Extension Shoulder Pendulum with Table Support  - 1-2 x daily - 3 sets - 10 reps  - Circular Shoulder Pendulum with Table Support  - 1-2 x daily - 3 sets - 10 reps  - Seated Scapular Retraction  - 1-4 x daily - 3 sets - 10 reps    Last follow-up visit does say at this time the patient can start weaning from sling, discussed weaning from sling in a comfortable house setting.  Continue to wear the sling outside of the house  There- ACT:    Neuro:    Manual:  Soft tissue techniques pectoralis major  Modalities:  Continued emphasis on utilization of ice    PT Evaluation Time Entry  PT Evaluation (Low) Time Entry: 15  PT Therapeutic Procedures Time Entry  Manual Therapy Time Entry: 10  Therapeutic Exercise Time Entry: 13       EDUCATION: Home exercise program, plan of care, activity modifications, pain management, and injury pathology     Code:  [id number]    Assessment: Patient presents with signs and symptoms consistent with R pec repair, resulting in limited participation in pain-free ADLs and inability to perform at their prior level of function. Pt would benefit from physical  therapy to address the impairments found & listed previously in the objective section in order to return to safe and pain-free ADLs and prior level of function.     Complexity:Low  Prognosis: Good  Response to care: Good    Problem List:  Pain  Function  mobility  strength  Plan:     Planned Interventions include: therapeutic exercise, self-care home management, manual therapy, therapeutic activities, gait training, neuromuscular coordination, vasopneumatic, dry needling, aquatic therapy    Patient instructed by physician no lifting until the next follow-up visit, passive range of motion as tolerated, active assisted as tolerated, limit external rotation to less than 30 degrees.,  Utilization of scapular interventions, continue with modalities as needed.    Next Treatment: Progress range of motion as tolerated, can continue with soft tissue techniques  Frequency: 2 x Week  Duration: 12 Weeks  Goals: Set and discussed today  Active       PT Problem       PT Goal 1       Start:  08/08/24    Expected End:  09/05/24       1.  Patient to be independent with home exercise program  2.  Patient reported pain less than 3/10 with active assisted range of motion  3.  Patient to have improved shoulder flexion 130 degrees pain less than 3/10 for functional mobility         PT Goal 2       Start:  08/08/24    Expected End:  10/03/24       1. The patient will report a decrease in score to 550%. Demonstrating a clinically significant change (MCID -18) in 6 weeks.   2. pain less than 2/10 shoulder range of motion 150 degrees active assisted  3.  Patient to have weaned from sling completely following postoperative precautions  4. active range of motion shoulder flexion 140 degrees for participation and overhead activity             Plan of care was developed with input and agreement by the patient      Benoit Corona, PT

## 2024-08-08 NOTE — PROGRESS NOTES
"  Physical Therapy  Physical Therapy Orthopedic Evaluation    Patient Name: Fran Garcia \"Scott\"  MRN: [id number]  Today's Date: 8/8/2024                  Insurance:  [place]  Authorization info: No auth  Insurance Type: Payor: GENERIC COMMERCIAL / Plan: GENERIC COMMERCIAL / Product Type: *No Product type* /      Current Problem  No diagnosis found.    Surgery:7/11/24 R shoulder Pec Repair    Referred by: Molly Jensen PA-C    Precautions:     Medical History Form: Reviewed (scanned into chart)  Reviewed medical form, [place], Falls risk, Scientologist beliefs, PHQ    Subjective:   Subjective   Chief Complaint: R Pec surgery/Repair  Onset: June 2024  VERONICA: Helping Plug a tire    General:   Patient opted for surgery secondary to positive MRI imaging  Current Condition:   Better    Pain:     Location: R Pec  Rating: Best-***, Current-***, Worst-***  Description: ***  Aggravating Factors:  ***  Relieving Factors:  ***    Relevant Information (PMH & Previous Tests/Imaging): + imaging prior to surgery    Previous Interventions/Treatments: None    Prior Level of Function (PLOF)  Patient previously independent with all ADLs  Exercise/Physical Activity: ***  Work/School: ***    Patients Living Environment: Reviewed and no concern  Primary Language: English  Patient's Goal(s) for Therapy: ***    Red Flags: Do you have any of the following? No  Fever/chills, unexplained weight changes, dizziness/fainting, unexplained change in bowel or bladder functions, unexplained malaise or muscle weakness, night pain/sweats, numbness or tingling    Objective:  Objective     Palpation/Observation  ***  Posture  ***  Special Testing  ***  ROM  8/8/2024  ***  Strength  8/8/2024  ***  Sensation  ***  Reflexes  ***    Transfers: ***  Gait: ***  Functional Reach test ER NT  Functional IR behind the back NT    Outcome Measures:      8/8/2024  Treatments:  Ther-EX:  ***  There- ACT:  ***  Neuro:  ***  Manual:  ***  Modalities:  ***        "        EDUCATION: Home exercise program, plan of care, activity modifications, pain management, and injury pathology     Code: ***    Assessment: Patient presents with signs and symptoms consistent with R pec repair, resulting in limited participation in pain-free ADLs and inability to perform at their prior level of function. Pt would benefit from physical therapy to address the impairments found & listed previously in the objective section in order to return to safe and pain-free ADLs and prior level of function.     Complexity:Low  Prognosis: ***  Response to care: ***    Problem List:  Pain  Function  mobility  strength  Plan:     Planned Interventions include: therapeutic exercise, self-care home management, manual therapy, therapeutic activities, gait training, neuromuscular coordination, vasopneumatic, dry needling, aquatic therapy    Next Treatment:***  Frequency: 2 x Week  Duration: 12 Weeks  Goals: Set and discussed today      Plan of care was developed with input and agreement by the patient      Benoit Corona, PT

## 2024-08-12 ENCOUNTER — TREATMENT (OUTPATIENT)
Dept: PHYSICAL THERAPY | Facility: CLINIC | Age: 39
End: 2024-08-12
Payer: COMMERCIAL

## 2024-08-12 DIAGNOSIS — S29.011D PECTORALIS MUSCLE RUPTURE, SUBSEQUENT ENCOUNTER: Primary | ICD-10-CM

## 2024-08-12 PROCEDURE — 97140 MANUAL THERAPY 1/> REGIONS: CPT | Mod: GP | Performed by: PHYSICAL THERAPIST

## 2024-08-12 PROCEDURE — 97110 THERAPEUTIC EXERCISES: CPT | Mod: GP | Performed by: PHYSICAL THERAPIST

## 2024-08-12 NOTE — PROGRESS NOTES
Physical Therapy  Physical Therapy Treatment Note    Patient Name: Fran Garcia  MRN: [id number]  Today's Date: 8/12/2024  Time Calculation  Start Time: 0827  Stop Time: 0900  Time Calculation (min): 33 min       PT Therapeutic Procedures Time Entry  Manual Therapy Time Entry: 20  Therapeutic Exercise Time Entry: 13     Referring: Molly Jensen CNP    Insurance:  Visit [place]: [place]    Authorization info: No Auth  Insurance Type: Payor: GENERIC COMMERCIAL / Plan: GENERIC COMMERCIAL / Product Type: *No Product type* /     Current Problem  No diagnosis found.    General   7/11/24 R shoulder Pec Repair     Precautions       Subjective:   Subjective   Patient reports he feels a little tight, started weaning from his sling at home, a little sore. Is going to wear it at work  HEP compliance: YES  Pain   Minor discomfort  Objective:   Objective   Other Outcome Measures: 100% dash   8/8/2024  ROM  8/8/2024  Right shoulder  Flexion 90 degrees open and feel, internal rotation to the abdomen/neutral, external rotation 20 degrees open end feel, no pain throughout.  Patient has full range of motion wrist hand elbow    8/12 ER 20deg  Strength  8/8/2024  3/5 wrist hand elbow no resistance applied    Treatments:   Ther Ex  Supine cane AAROM x 15  Serratus punch supported supine x 20  pendulums  Manual  STM pec, PROM R shoulder  Neuro Re-ed    There-Act    Modalities    Declined ice     PT Therapeutic Procedures Time Entry  Manual Therapy Time Entry: 20  Therapeutic Exercise Time Entry: 13     HEP Access Code:[email or web address]   Assessment:  Tolerated treatment well without pain, understands plan of care. Slow progress with ROM.     Plan: Continue plan of care     Goals:  Active       PT Problem       PT Goal 1       Start:  08/08/24    Expected End:  09/05/24       1.  Patient to be independent with home exercise program  2.  Patient reported pain less than 3/10 with active assisted range of motion  3.  Patient to have improved shoulder  flexion 130 degrees pain less than 3/10 for functional mobility         PT Goal 2       Start:  08/08/24    Expected End:  10/03/24       1. The patient will report a decrease in score to 550%. Demonstrating a clinically significant change (MCID -18) in 6 weeks.   2. pain less than 2/10 shoulder range of motion 150 degrees active assisted  3.  Patient to have weaned from sling completely following postoperative precautions  4. active range of motion shoulder flexion 140 degrees for participation and overhead activity              Benoit Corona, PT

## 2024-08-15 ENCOUNTER — TREATMENT (OUTPATIENT)
Dept: PHYSICAL THERAPY | Facility: CLINIC | Age: 39
End: 2024-08-15
Payer: COMMERCIAL

## 2024-08-15 DIAGNOSIS — S29.011D PECTORALIS MUSCLE RUPTURE, SUBSEQUENT ENCOUNTER: Primary | ICD-10-CM

## 2024-08-15 PROCEDURE — 97140 MANUAL THERAPY 1/> REGIONS: CPT | Mod: GP | Performed by: PHYSICAL THERAPIST

## 2024-08-15 PROCEDURE — 97110 THERAPEUTIC EXERCISES: CPT | Mod: GP | Performed by: PHYSICAL THERAPIST

## 2024-08-15 NOTE — PROGRESS NOTES
Physical Therapy  Physical Therapy Treatment Note    Patient Name: Fran Garcia  MRN: [id number]  Today's Date: 8/15/2024  Time Calculation  Start Time: 0816  Stop Time: 0859  Time Calculation (min): 43 min       PT Therapeutic Procedures Time Entry  Manual Therapy Time Entry: 15  Therapeutic Exercise Time Entry: 28     Referring: Molly Jensen CNP    Insurance:  Visit [place]: [place] of [place]    Authorization info: No Auth  Insurance Type: Payor: GENERIC COMMERCIAL / Plan: GENERIC COMMERCIAL / Product Type: *No Product type* /     Current Problem  No diagnosis found.    General   7/11/24 R shoulder Pec Repair     Precautions       Subjective:   Subjective   Patient reports he feels good and is sleeping well  HEP compliance: YES  Pain   Minor discomfort  Objective:   Objective   Other Outcome Measures: 100% dash   8/8/2024  ROM  8/8/2024  Right shoulder  Flexion 90 degrees open and feel, internal rotation to the abdomen/neutral, external rotation 20 degrees open end feel, no pain throughout.  Patient has full range of motion wrist hand elbow    8/12 ER 20deg  Strength  8/8/2024  3/5 wrist hand elbow no resistance applied    Treatments:   Ther Ex  Supine  AAROM x 15  Serratus punch supported supine 2 x 15  Bent over row 2 x 15  Wall walks flexion x 10 AAROM    pendulums  Manual  STM pec, PROM R shoulder  Neuro Re-ed    There-Act    Modalities    Declined ice     PT Therapeutic Procedures Time Entry  Manual Therapy Time Entry: 15  Therapeutic Exercise Time Entry: 28     HEP Access Code:[email or web address]   Assessment:  No pain with treatment, ROM is progressing well, continues to be motivated. He is following his precautions well     Plan: Continue plan of care     Goals:  Active       PT Problem       PT Goal 1       Start:  08/08/24    Expected End:  09/05/24       1.  Patient to be independent with home exercise program  2.  Patient reported pain less than 3/10 with active assisted range of motion  3.  Patient to have improved  shoulder flexion 130 degrees pain less than 3/10 for functional mobility         PT Goal 2       Start:  08/08/24    Expected End:  10/03/24       1. The patient will report a decrease in score to 550%. Demonstrating a clinically significant change (MCID -18) in 6 weeks.   2. pain less than 2/10 shoulder range of motion 150 degrees active assisted  3.  Patient to have weaned from sling completely following postoperative precautions  4. active range of motion shoulder flexion 140 degrees for participation and overhead activity              Benoit Corona, PT

## 2024-08-19 ENCOUNTER — TREATMENT (OUTPATIENT)
Dept: PHYSICAL THERAPY | Facility: CLINIC | Age: 39
End: 2024-08-19
Payer: COMMERCIAL

## 2024-08-19 DIAGNOSIS — S29.011D PECTORALIS MUSCLE RUPTURE, SUBSEQUENT ENCOUNTER: Primary | ICD-10-CM

## 2024-08-19 PROCEDURE — 97110 THERAPEUTIC EXERCISES: CPT | Mod: GP | Performed by: PHYSICAL THERAPIST

## 2024-08-19 PROCEDURE — 97140 MANUAL THERAPY 1/> REGIONS: CPT | Mod: GP | Performed by: PHYSICAL THERAPIST

## 2024-08-19 NOTE — PROGRESS NOTES
Physical Therapy  Physical Therapy Treatment Note    Patient Name: Fran Garcia  MRN: [id number]  Today's Date: 8/19/2024  Time Calculation  Start Time: 0828  Stop Time: 0900  Time Calculation (min): 32 min       PT Therapeutic Procedures Time Entry  Manual Therapy Time Entry: 15  Therapeutic Exercise Time Entry: 17     Referring: Molly Jensen CNP    Insurance:  Visit [place]: [place] of [place]    Authorization info: No Auth  Insurance Type: Payor: GENERIC COMMERCIAL / Plan: GENERIC COMMERCIAL / Product Type: *No Product type* /     Current Problem  No diagnosis found.    General   7/11/24 R shoulder Pec Repair     Precautions       Subjective:   Subjective   Patient reports he feels good, weaning from the sling  HEP compliance: YES  Pain   Minor discomfort  Objective:   Objective   Other Outcome Measures: 100% dash   8/8/2024  ROM  8/8/2024  Right shoulder  Flexion 90 degrees open and feel, internal rotation to the abdomen/neutral, external rotation 20 degrees open end feel, no pain throughout.  Patient has full range of motion wrist hand elbow    8/12 ER 20deg  Strength  8/8/2024  3/5 wrist hand elbow no resistance applied    Treatments:   Ther Ex  Supine  AAROM x 15  Pulleys x 5'  Supine shoulder flexion x 15  Wall walks flexion x 10 AAROM    pendulums  Manual  STM pec, PROM R shoulder  Neuro Re-ed    There-Act    Modalities    Declined ice     PT Therapeutic Procedures Time Entry  Manual Therapy Time Entry: 15  Therapeutic Exercise Time Entry: 17     HEP Access Code:[email or web address]   Assessment:  No pain with treatment, ROM is progressing well     Plan: Continue plan of care     Goals:  Active       PT Problem       PT Goal 1       Start:  08/08/24    Expected End:  09/05/24       1.  Patient to be independent with home exercise program  2.  Patient reported pain less than 3/10 with active assisted range of motion  3.  Patient to have improved shoulder flexion 130 degrees pain less than 3/10 for functional mobility         PT  Goal 2       Start:  08/08/24    Expected End:  10/03/24       1. The patient will report a decrease in score to 550%. Demonstrating a clinically significant change (MCID -18) in 6 weeks.   2. pain less than 2/10 shoulder range of motion 150 degrees active assisted  3.  Patient to have weaned from sling completely following postoperative precautions  4. active range of motion shoulder flexion 140 degrees for participation and overhead activity              Benoit Corona, PT

## 2024-08-22 ENCOUNTER — TREATMENT (OUTPATIENT)
Dept: PHYSICAL THERAPY | Facility: CLINIC | Age: 39
End: 2024-08-22
Payer: COMMERCIAL

## 2024-08-22 DIAGNOSIS — S29.011D PECTORALIS MUSCLE RUPTURE, SUBSEQUENT ENCOUNTER: Primary | ICD-10-CM

## 2024-08-22 PROCEDURE — 97140 MANUAL THERAPY 1/> REGIONS: CPT | Mod: GP | Performed by: PHYSICAL THERAPIST

## 2024-08-22 PROCEDURE — 97110 THERAPEUTIC EXERCISES: CPT | Mod: GP | Performed by: PHYSICAL THERAPIST

## 2024-08-22 NOTE — PROGRESS NOTES
Physical Therapy  Physical Therapy Treatment Note    Patient Name: Fran Garcia  MRN: [id number]  Today's Date: 8/22/2024  Time Calculation  Start Time: 0818  Stop Time: 0900  Time Calculation (min): 42 min       PT Therapeutic Procedures Time Entry  Manual Therapy Time Entry: 25  Therapeutic Exercise Time Entry: 15     Referring: Molly Jensen CNP    Insurance:  Visit [place]: [place] of [place]    Authorization info: No Auth  Insurance Type: Payor: GENERIC COMMERCIAL / Plan: GENERIC COMMERCIAL / Product Type: *No Product type* /     Current Problem  No diagnosis found.    General   7/11/24 R shoulder Pec Repair     Precautions     ER less than 30 deg  Subjective:   Subjective   Patient reports he feels good, weaning from the sling  HEP compliance: YES  Pain   Minor discomfort  Objective:   Objective   Other Outcome Measures: 100% dash   8/8/2024  ROM  8/8/2024  Right shoulder  Flexion 90 degrees open and feel, internal rotation to the abdomen/neutral, external rotation 20 degrees open end feel, no pain throughout.  Patient has full range of motion wrist hand elbow    8/12 ER 20deg  8/22 145 AAROM flexion   Strength  8/8/2024  3/5 wrist hand elbow no resistance applied    Treatments:   Ther Ex  Supine  AAROM x 15  Pulleys x 5'  Supine shoulder flexion x 15  Wall walks flexion x 10 AAROM    pendulums  Manual  STM pec, PROM R shoulder  Neuro Re-ed    There-Act    Modalities    Declined ice     PT Therapeutic Procedures Time Entry  Manual Therapy Time Entry: 25  Therapeutic Exercise Time Entry: 15     HEP Access Code:[email or web address]   Assessment:  Patient working toward discarding sling, pain is controlled, ROM is improving      Plan: Continue plan of care     Goals:  Active       PT Problem       PT Goal 1       Start:  08/08/24    Expected End:  09/05/24       1.  Patient to be independent with home exercise program  2.  Patient reported pain less than 3/10 with active assisted range of motion  3.  Patient to have improved shoulder  flexion 130 degrees pain less than 3/10 for functional mobility         PT Goal 2       Start:  08/08/24    Expected End:  10/03/24       1. The patient will report a decrease in score to 550%. Demonstrating a clinically significant change (MCID -18) in 6 weeks.   2. pain less than 2/10 shoulder range of motion 150 degrees active assisted  3.  Patient to have weaned from sling completely following postoperative precautions  4. active range of motion shoulder flexion 140 degrees for participation and overhead activity              Benoit Corona, PT

## 2024-08-26 ENCOUNTER — TREATMENT (OUTPATIENT)
Dept: PHYSICAL THERAPY | Facility: CLINIC | Age: 39
End: 2024-08-26
Payer: COMMERCIAL

## 2024-08-26 DIAGNOSIS — S29.011D PECTORALIS MUSCLE RUPTURE, SUBSEQUENT ENCOUNTER: Primary | ICD-10-CM

## 2024-08-26 PROCEDURE — 97140 MANUAL THERAPY 1/> REGIONS: CPT | Mod: GP | Performed by: PHYSICAL THERAPIST

## 2024-08-26 PROCEDURE — 97110 THERAPEUTIC EXERCISES: CPT | Mod: GP | Performed by: PHYSICAL THERAPIST

## 2024-08-26 NOTE — PROGRESS NOTES
"  Physical Therapy  Physical Therapy Treatment Note    Patient Name: Fran Garcia  MRN: [id number]  Today's Date: 8/26/2024  Time Calculation  Start Time: 1600  Stop Time: 1640  Time Calculation (min): 40 min       PT Therapeutic Procedures Time Entry  Manual Therapy Time Entry: 15  Therapeutic Exercise Time Entry: 25     Referring: Molly Jensen CNP    Insurance:  Visit [place]: 6 of [place]    Authorization info: No Auth  Insurance Type: Payor: GENERIC COMMERCIAL / Plan: GENERIC COMMERCIAL / Product Type: *No Product type* /     Current Problem  1. Pectoralis muscle rupture, subsequent encounter            General   7/11/24 R shoulder Pec Repair     Precautions     ER less than 30 deg  Subjective:   Subjective   Patient reports he feels good, careful with movements while at work  HEP compliance: YES  Pain   Minor discomfort  Objective:   Objective   Other Outcome Measures: 100% dash   8/8/2024  ROM  8/8/2024  Right shoulder  Flexion 90 degrees open and feel, internal rotation to the abdomen/neutral, external rotation 20 degrees open end feel, no pain throughout.  Patient has full range of motion wrist hand elbow    8/12 ER 20deg  8/22 145 AAROM flexion   Strength  8/8/2024  3/5 wrist hand elbow no resistance applied    Treatments:   Ther Ex  Supine  AAROM x 15  Pulleys x 5'  Supine shoulder flexion x 15  Ball on wall flexion and scpation  Thoracic half foam roll seated ext stretch 10 x 10\"\"    pendulums  Manual  STM pec, PROM R shoulder  Neuro Re-ed    There-Act    Modalities    Declined ice     PT Therapeutic Procedures Time Entry  Manual Therapy Time Entry: 15  Therapeutic Exercise Time Entry: 25     HEP Access Code:[email or web address]   Assessment:  Patient doing well overall, minimal pain at this time. Shoulder has excellent ROM and end feel with flexion. Continues to follow ER restrictions     Plan: Continue plan of care     Goals:  Active       PT Problem       PT Goal 1       Start:  08/08/24    Expected End:  09/05/24       " 1.  Patient to be independent with home exercise program  2.  Patient reported pain less than 3/10 with active assisted range of motion  3.  Patient to have improved shoulder flexion 130 degrees pain less than 3/10 for functional mobility         PT Goal 2       Start:  08/08/24    Expected End:  10/03/24       1. The patient will report a decrease in score to 550%. Demonstrating a clinically significant change (MCID -18) in 6 weeks.   2. pain less than 2/10 shoulder range of motion 150 degrees active assisted  3.  Patient to have weaned from sling completely following postoperative precautions  4. active range of motion shoulder flexion 140 degrees for participation and overhead activity              Benoit Corona, PT

## 2024-08-26 NOTE — PROGRESS NOTES
Subjective   Patient ID: Fran Garcia is a 39 y.o. male    Chief Complaint:   No chief complaint on file.       Last Surgery: Right pectoralis repair ( Arthrex Buttons and Anchors, Beach Chair ) - Right  Date of Last Surgery: 7/11/2024    HPI  Fran Garcia is a 39 y.o. ambidextrous male presenting for right shoulder pain.     He is here today after hurting himself in June while helping a friend plug a tire. He initially felt a sharp pain. He iced and used OTC medications as needed and felt better. However, his pain has continued and it is affecting his daily activities.     He does like weight lifting.    8/27/24   Fran Garcia is a 39 y.o. male returning to the clinic for a post operative visit regarding his right shoulder. He is s/p right pectoralis repair on 7/11/24.     He reports no pain today but has been experiencing some tightness.     Objective   Patient is a well-developed, well-nourished male in no acute distress.  Breathes with normal chest rises.  Pupils are round and symmetric today.  Awake, alert, and oriented x3.      Examination of the left shoulder today reveals the skin to be intact. There is no sign of any atrophy, lesions, or abrasions. There is no pain to palpation of the bony prominences. Cervical lymphadenopathy examined, and this was negative. Patient had 5 out of 5 wrist flexion, extension, and thumb extension bilaterally. Sensation was intact to light touch to median, ulnar, radial axillary, and musculocutaneous nerves bilaterally. Positive radial pulse bilaterally. Provocative maneuvers today were negative. Range of motion of the left shoulder revealed 0-170° of forward elevation, 0-60° of external rotation, and internal rotation was to T-12.     Patient portal shoulder incisions and open incision are dry, intact and healing well. Minimal swelling. No warmth or erythema. No ecchymosis. Sutures were removed today. Neurovascularly intact distally. 5 out of 5 wrist, hand and thumb flexion  and extension without pain. Full active range of motion of elbow. Range of motion of the left shoulder revealed 0-170° of forward elevation, 0-60° of external rotation, and internal rotation was to T-12. His shoulder contour is excellent. Excellent strength testing. Some minor atropy but no signs of incongruity.      Imaging:  X-rays of the right shoulder taken 07/03/24 and interpreted by me show no signs of any fracture, dislocation, arthritis or proximal humerus migration.  Some AC joint arthritis, asymptomatic     Assessment/Plan   No diagnosis found.  Patient is 9 weeks s/p right pectoralis repair    At this point he is largely healed, but cautioned him that he is not as strong as he was prior to surgery. We will begin working on strengthening for the next few months, lifting no more than 5-10lbs. We will see him back mid October for a repeat clinical exam. We will likely release him to all activities at that time. I advised him that heavy weight lifting should not happen before 6 months into his recovery. His physical therapy prescription was updated today.     No orders of the defined types were placed in this encounter.    Follow up in 2 months    Scribe Attestation  By signing my name below, I, Bekah Steve   attest that this documentation has been prepared under the direction and in the presence of Ti Bravo MD.

## 2024-08-27 ENCOUNTER — OFFICE VISIT (OUTPATIENT)
Dept: ORTHOPEDIC SURGERY | Facility: HOSPITAL | Age: 39
End: 2024-08-27
Payer: COMMERCIAL

## 2024-08-27 DIAGNOSIS — M25.511 RIGHT SHOULDER PAIN, UNSPECIFIED CHRONICITY: Primary | ICD-10-CM

## 2024-08-27 PROCEDURE — 99211 OFF/OP EST MAY X REQ PHY/QHP: CPT | Performed by: ORTHOPAEDIC SURGERY

## 2024-08-29 ENCOUNTER — TREATMENT (OUTPATIENT)
Dept: PHYSICAL THERAPY | Facility: CLINIC | Age: 39
End: 2024-08-29
Payer: COMMERCIAL

## 2024-08-29 DIAGNOSIS — S29.011D PECTORALIS MUSCLE RUPTURE, SUBSEQUENT ENCOUNTER: Primary | ICD-10-CM

## 2024-08-29 PROCEDURE — 97110 THERAPEUTIC EXERCISES: CPT | Mod: GP | Performed by: PHYSICAL THERAPIST

## 2024-08-29 PROCEDURE — 97140 MANUAL THERAPY 1/> REGIONS: CPT | Mod: GP | Performed by: PHYSICAL THERAPIST

## 2024-08-29 NOTE — PROGRESS NOTES
"  Physical Therapy  Physical Therapy Treatment Note    Patient Name: Fran Garcia  MRN: [id number]  Today's Date: 8/29/2024  Time Calculation  Start Time: 0815  Stop Time: 0858  Time Calculation (min): 43 min       PT Therapeutic Procedures Time Entry  Manual Therapy Time Entry: 10  Therapeutic Exercise Time Entry: 33     Referring: Molly Jensen CNP    Insurance:  Visit [place]: 7 of [place]    Authorization info: No Auth  Insurance Type: Payor: GENERIC COMMERCIAL / Plan: GENERIC COMMERCIAL / Product Type: *No Product type* /     Current Problem  No diagnosis found.      General   7/11/24 R shoulder Pec Repair     Precautions     ER less than 30 deg  Subjective:   Subjective   Patient reports he feels good, careful with movements while at work  HEP compliance: YES  Pain   Minor discomfort  Objective:   Objective   Other Outcome Measures: 100% dash   8/8/2024  ROM  8/8/2024  Right shoulder  Flexion 90 degrees open and feel, internal rotation to the abdomen/neutral, external rotation 20 degrees open end feel, no pain throughout.  Patient has full range of motion wrist hand elbow    8/12 ER 20deg  8/22 145 AAROM flexion   Strength  8/8/2024  3/5 wrist hand elbow no resistance applied    Treatments:   Ther Ex  Pulleys x 3'  UBE x 6'  Mid row 5lb 2 x 15 bent over  Horizontal abd 2# 3 x 10  Prone Y 2# 3 x 10  ER wand 10 x 10\"  IR strap 10 x 10\"  Serratus punch 5lb 2 x 15  Supine cane flexion 10x 10\" 3#      pendulums  Manual  STM pec  Neuro Re-ed    There-Act    Modalities    Declined ice     PT Therapeutic Procedures Time Entry  Manual Therapy Time Entry: 10  Therapeutic Exercise Time Entry: 33     HEP Access Code:[email or web address]   Assessment:  Patient doing well overall, tolerated scapular interventions with weight well today     Plan: Continue plan of care     Goals:  Active       PT Problem       PT Goal 1       Start:  08/08/24    Expected End:  09/05/24       1.  Patient to be independent with home exercise program  2.  Patient " reported pain less than 3/10 with active assisted range of motion  3.  Patient to have improved shoulder flexion 130 degrees pain less than 3/10 for functional mobility         PT Goal 2       Start:  08/08/24    Expected End:  10/03/24       1. The patient will report a decrease in score to 550%. Demonstrating a clinically significant change (MCID -18) in 6 weeks.   2. pain less than 2/10 shoulder range of motion 150 degrees active assisted  3.  Patient to have weaned from sling completely following postoperative precautions  4. active range of motion shoulder flexion 140 degrees for participation and overhead activity              Benoit Corona, PT

## 2024-08-30 ENCOUNTER — APPOINTMENT (OUTPATIENT)
Dept: ORTHOPEDIC SURGERY | Facility: HOSPITAL | Age: 39
End: 2024-08-30
Payer: COMMERCIAL

## 2024-09-03 ENCOUNTER — TREATMENT (OUTPATIENT)
Dept: PHYSICAL THERAPY | Facility: CLINIC | Age: 39
End: 2024-09-03
Payer: COMMERCIAL

## 2024-09-03 DIAGNOSIS — S29.011D PECTORALIS MUSCLE RUPTURE, SUBSEQUENT ENCOUNTER: Primary | ICD-10-CM

## 2024-09-03 PROCEDURE — 97140 MANUAL THERAPY 1/> REGIONS: CPT | Mod: GP | Performed by: PHYSICAL THERAPIST

## 2024-09-03 PROCEDURE — 97110 THERAPEUTIC EXERCISES: CPT | Mod: GP | Performed by: PHYSICAL THERAPIST

## 2024-09-03 NOTE — PROGRESS NOTES
Physical Therapy  Physical Therapy Treatment Note    Patient Name: Fran Garcia  MRN: [id number]  Today's Date: 9/3/2024  Time Calculation  Start Time: 0830  Stop Time: 0913  Time Calculation (min): 43 min       PT Therapeutic Procedures Time Entry  Manual Therapy Time Entry: 10  Therapeutic Exercise Time Entry: 32     Referring: Molly Jensen CNP    Insurance:  Visit [place]: [place] of [place]    Authorization info: No Auth  Insurance Type: Payor: AETNA / Plan: AETNA  HEALTHCARE / Product Type: *No Product type* /     Current Problem  1. Pectoralis muscle rupture, subsequent encounter              General   7/11/24 R shoulder Pec Repair     Precautions     ER less than 30 deg  Subjective:   Subjective   Patient reports he feels good, happy with new exercises   HEP compliance: YES  Pain   Minor discomfort  Objective:   Objective   Other Outcome Measures: 100% dash   8/8/2024  ROM  8/8/2024  Right shoulder  Flexion 90 degrees open and feel, internal rotation to the abdomen/neutral, external rotation 20 degrees open end feel, no pain throughout.  Patient has full range of motion wrist hand elbow    8/12 ER 20deg  8/22 145 AAROM flexion   Strength  8/8/2024  3/5 wrist hand elbow no resistance applied    Treatments:   Ther Ex  UBE x 6'  Mid row 8lb 2 x 15 bent over  Horizontal abd 3# 2x15  Prone Y 3# 3 x 10   diagonals 4 lb med ball 2x15 ea   Alphabet nasreen 6 lbs x 1     pendulums  Manual  STM pec  Neuro Re-ed    There-Act    Modalities    Declined ice     PT Therapeutic Procedures Time Entry  Manual Therapy Time Entry: 10  Therapeutic Exercise Time Entry: 32     HEP Access Code:[email or web address]   Assessment:  Patient progressed well with strengthening, no reports of increased pain     Plan: Continue plan of care     Goals:  Active       PT Problem       PT Goal 1       Start:  08/08/24    Expected End:  09/05/24       1.  Patient to be independent with home exercise program  2.  Patient reported pain less than 3/10 with active assisted  range of motion  3.  Patient to have improved shoulder flexion 130 degrees pain less than 3/10 for functional mobility         PT Goal 2       Start:  08/08/24    Expected End:  10/03/24       1. The patient will report a decrease in score to 550%. Demonstrating a clinically significant change (MCID -18) in 6 weeks.   2. pain less than 2/10 shoulder range of motion 150 degrees active assisted  3.  Patient to have weaned from sling completely following postoperative precautions  4. active range of motion shoulder flexion 140 degrees for participation and overhead activity              Benoit Corona, PT

## 2024-09-05 ENCOUNTER — TREATMENT (OUTPATIENT)
Dept: PHYSICAL THERAPY | Facility: CLINIC | Age: 39
End: 2024-09-05
Payer: COMMERCIAL

## 2024-09-05 DIAGNOSIS — S29.011D PECTORALIS MUSCLE RUPTURE, SUBSEQUENT ENCOUNTER: Primary | ICD-10-CM

## 2024-09-05 PROCEDURE — 97140 MANUAL THERAPY 1/> REGIONS: CPT | Mod: GP | Performed by: PHYSICAL THERAPIST

## 2024-09-05 PROCEDURE — 97110 THERAPEUTIC EXERCISES: CPT | Mod: GP | Performed by: PHYSICAL THERAPIST

## 2024-09-05 NOTE — PROGRESS NOTES
Physical Therapy  Physical Therapy Treatment Note    Patient Name: Fran Garcia  MRN: [id number]  Today's Date: 9/5/2024  Time Calculation  Start Time: 0824  Stop Time: 0905  Time Calculation (min): 41 min       PT Therapeutic Procedures Time Entry  Manual Therapy Time Entry: 8  Therapeutic Exercise Time Entry: 33     Referring: Molly Jensen CNP    Insurance:  Visit [place]: [place] of [place]    Authorization info: No Auth  Insurance Type: Payor: AETNA / Plan: AETNA  HEALTHCARE / Product Type: *No Product type* /     Current Problem  No diagnosis found.        General   7/11/24 R shoulder Pec Repair     Precautions     ER less than 30 deg  Subjective:   Subjective   Patient reports he feels good feels definition is returning for his pec   HEP compliance: YES  Pain   Minor discomfort  Objective:   Objective   Other Outcome Measures: 100% dash   8/8/2024  ROM  8/8/2024  Right shoulder  Flexion 90 degrees open and feel, internal rotation to the abdomen/neutral, external rotation 20 degrees open end feel, no pain throughout.  Patient has full range of motion wrist hand elbow    8/12 ER 20deg  8/22 145 AAROM flexion   Strength  8/8/2024  3/5 wrist hand elbow no resistance applied    Treatments:   Ther Ex  UBE x 6'  0-50 6 lb med ball nasreen UE  Diagonal chops half kneel on foam 6 lbs  2lb iso holds with straight arm raise 2 x 10 flex, scpation  Supine Bench press 5 lb 3 x 10  Supine IR/ER arm straight 6 lb med ball nasreen UE rotations      pendulums  Manual  STM pec  Neuro Re-ed    There-Act    Modalities    Declined ice     PT Therapeutic Procedures Time Entry  Manual Therapy Time Entry: 8  Therapeutic Exercise Time Entry: 33     HEP Access Code:[email or web address]   Assessment:  Fatigued with increased strengthening, and isometric holds.     Plan: Continue plan of care     Goals:  Active       PT Problem       PT Goal 1       Start:  08/08/24    Expected End:  09/05/24       1.  Patient to be independent with home exercise program  2.  Patient  reported pain less than 3/10 with active assisted range of motion  3.  Patient to have improved shoulder flexion 130 degrees pain less than 3/10 for functional mobility         PT Goal 2       Start:  08/08/24    Expected End:  10/03/24       1. The patient will report a decrease in score to 550%. Demonstrating a clinically significant change (MCID -18) in 6 weeks.   2. pain less than 2/10 shoulder range of motion 150 degrees active assisted  3.  Patient to have weaned from sling completely following postoperative precautions  4. active range of motion shoulder flexion 140 degrees for participation and overhead activity              Benoit Corona, PT

## 2024-09-09 ENCOUNTER — APPOINTMENT (OUTPATIENT)
Dept: PHYSICAL THERAPY | Facility: CLINIC | Age: 39
End: 2024-09-09
Payer: COMMERCIAL

## 2024-09-17 ENCOUNTER — TREATMENT (OUTPATIENT)
Dept: PHYSICAL THERAPY | Facility: CLINIC | Age: 39
End: 2024-09-17
Payer: COMMERCIAL

## 2024-09-17 DIAGNOSIS — S29.011D PECTORALIS MUSCLE RUPTURE, SUBSEQUENT ENCOUNTER: Primary | ICD-10-CM

## 2024-09-17 PROCEDURE — 97110 THERAPEUTIC EXERCISES: CPT | Mod: GP | Performed by: PHYSICAL THERAPIST

## 2024-09-17 NOTE — PROGRESS NOTES
Physical Therapy  Physical Therapy Treatment Note    Patient Name: Fran Garcia  MRN: [id number]  Today's Date: 9/17/2024  Time Calculation  Start Time: 0836  Stop Time: 0917  Time Calculation (min): 41 min       PT Therapeutic Procedures Time Entry  Therapeutic Exercise Time Entry: 40     Referring: Molly Jensen CNP    Insurance:  Visit [place]: [place] of [place]    Authorization info: No Auth  Insurance Type: Payor: AETNA / Plan: AETNA  HEALTHCARE / Product Type: *No Product type* /     Current Problem  No diagnosis found.        General   7/11/24 R shoulder Pec Repair     Precautions     ER less than 30 deg  Subjective:   Subjective   Patient reports he feels good feels definition is returning for his pec   HEP compliance: YES  Pain   Minor discomfort  Objective:   Objective   Other Outcome Measures: 100% dash   8/8/2024  ROM  8/8/2024  Right shoulder  Flexion 90 degrees open and feel, internal rotation to the abdomen/neutral, external rotation 20 degrees open end feel, no pain throughout.  Patient has full range of motion wrist hand elbow    8/12 ER 20deg  8/22 145 AAROM flexion   Strength  8/8/2024  3/5 wrist hand elbow no resistance applied    Treatments:   Ther Ex  UBE x 6'  ABC x  1 11 lb med ball nasreen UE  Diagonal chops half kneel on foam 11 lbs nasreen    horiz Bench press 10 lb 3 x 10  Supine IR/ER arm straight 3 lb at 90deg abd  10lb cable row-mid 2 x 15  5lb horizontal add 3 x 10 cable    pendulums  Manual    Neuro Re-ed    There-Act    Modalities    Declined ice     PT Therapeutic Procedures Time Entry  Therapeutic Exercise Time Entry: 40     HEP Access Code:[email or web address]   Assessment:  Good progress with exercise and progress. Good form with strengthening exercises     Plan: Continue plan of care, Re assess goals at next treatment      Goals:  Active       PT Problem       PT Goal 1 (Met)       Start:  08/08/24    Expected End:  09/05/24    Resolved:  09/17/24    1.  Patient to be independent with home exercise  program  2.  Patient reported pain less than 3/10 with active assisted range of motion  3.  Patient to have improved shoulder flexion 130 degrees pain less than 3/10 for functional mobility         PT Goal 2 (Progressing)       Start:  08/08/24    Expected End:  10/03/24       1. The patient will report a decrease in score to 550%. Demonstrating a clinically significant change (MCID -18) in 6 weeks.   2. pain less than 2/10 shoulder range of motion 150 degrees active assisted  3.  Patient to have weaned from sling completely following postoperative precautions  4. active range of motion shoulder flexion 140 degrees for participation and overhead activity              Benoit Corona, PT

## 2024-09-19 ENCOUNTER — TREATMENT (OUTPATIENT)
Dept: PHYSICAL THERAPY | Facility: CLINIC | Age: 39
End: 2024-09-19
Payer: COMMERCIAL

## 2024-09-19 DIAGNOSIS — S29.011D PECTORALIS MUSCLE RUPTURE, SUBSEQUENT ENCOUNTER: Primary | ICD-10-CM

## 2024-09-19 PROCEDURE — 97110 THERAPEUTIC EXERCISES: CPT | Mod: GP | Performed by: PHYSICAL THERAPIST

## 2024-09-19 PROCEDURE — 97140 MANUAL THERAPY 1/> REGIONS: CPT | Mod: GP | Performed by: PHYSICAL THERAPIST

## 2024-09-19 NOTE — PROGRESS NOTES
"  Physical Therapy  Physical Therapy Treatment Note    Patient Name: Fran Garcia  MRN: [id number]  Today's Date: 9/19/2024  Time Calculation  Start Time: 0817  Stop Time: 0909  Time Calculation (min): 52 min       PT Therapeutic Procedures Time Entry  Manual Therapy Time Entry: 10  Therapeutic Exercise Time Entry: 35     Referring: Molly Jensen CNP    Insurance:  Visit [place]: 11 of [place]    Authorization info: No Auth  Insurance Type: Payor: AETNA / Plan: AETNA  HEALTHCARE / Product Type: *No Product type* /     Current Problem  1. Pectoralis muscle rupture, subsequent encounter                General   7/11/24 R shoulder Pec Repair     Precautions     ER less than 30 deg  Subjective:   Subjective   Patient reports he feels good, not sore after therapy  HEP compliance: YES  Pain   Minor discomfort  Objective:   Objective   Other Outcome Measures: 100% dash   8/8/2024  ROM  8/8/2024  Right shoulder  Flexion 90 degrees open and feel, internal rotation to the abdomen/neutral, external rotation 20 degrees open end feel, no pain throughout.  Patient has full range of motion wrist hand elbow    8/12 ER 20deg  8/22 145 AAROM flexion   Strength  8/8/2024  3/5 wrist hand elbow no resistance applied    Treatments:   Ther Ex  UBE x 6'  \"0-50\"x  1 11 lb med ball nasreen UE  7.5# d2 pattern cable 3 x 10  Bicep 3 x 15 10#  Tricep 3 x 15 10#  Static hold jobes 2 x 15 2#  10lb cable row-mid 2 x 15  7.5lb horizontal add 3 x 10 cable    pendulums  Manual    Neuro Re-ed    There-Act    Modalities    Declined ice     PT Therapeutic Procedures Time Entry  Manual Therapy Time Entry: 10  Therapeutic Exercise Time Entry: 35     HEP Access Code:[email or web address]   Assessment:  Progressing well with treatment, no increased pain. Mild fatigue at the end of treatment     Plan: Continue plan of care     Goals:  Active       PT Problem       PT Goal 1 (Met)       Start:  08/08/24    Expected End:  09/05/24    Resolved:  09/17/24    1.  Patient to be " independent with home exercise program  2.  Patient reported pain less than 3/10 with active assisted range of motion  3.  Patient to have improved shoulder flexion 130 degrees pain less than 3/10 for functional mobility         PT Goal 2 (Progressing)       Start:  08/08/24    Expected End:  10/03/24       1. The patient will report a decrease in score to 550%. Demonstrating a clinically significant change (MCID -18) in 6 weeks.   2. pain less than 2/10 shoulder range of motion 150 degrees active assisted  3.  Patient to have weaned from sling completely following postoperative precautions  4. active range of motion shoulder flexion 140 degrees for participation and overhead activity              Benoit Corona, PT

## 2024-09-24 ENCOUNTER — TREATMENT (OUTPATIENT)
Dept: PHYSICAL THERAPY | Facility: CLINIC | Age: 39
End: 2024-09-24
Payer: COMMERCIAL

## 2024-09-24 DIAGNOSIS — S29.011D PECTORALIS MUSCLE RUPTURE, SUBSEQUENT ENCOUNTER: Primary | ICD-10-CM

## 2024-09-24 PROCEDURE — 97110 THERAPEUTIC EXERCISES: CPT | Mod: GP | Performed by: PHYSICAL THERAPIST

## 2024-09-24 NOTE — PROGRESS NOTES
"  Physical Therapy  Physical Therapy Treatment Note    Patient Name: Fran Garcia  MRN: [id number]  Today's Date: 9/24/2024  Time Calculation  Start Time: 0830  Stop Time: 0912  Time Calculation (min): 42 min       PT Therapeutic Procedures Time Entry  Therapeutic Exercise Time Entry: 40     Referring: Molly Jensen CNP    Insurance:  Visit number: 12 of [place]    Authorization info: No Auth  Insurance Type: Payor: AETNA / Plan: AETNA  HEALTHCARE / Product Type: *No Product type* /     Current Problem  1. Pectoralis muscle rupture, subsequent encounter                  General   7/11/24 R shoulder Pec Repair     Precautions     ER less than 30 deg  Subjective:   Subjective   Patient reports he feels good, notices not as much definition through the surgical side  HEP compliance: YES  Pain   Minor discomfort at times  Objective:   Objective   Other Outcome Measures: 100% dash   8/8/2024  ROM  8/8/2024  Right shoulder  Flexion 90 degrees open and feel, internal rotation to the abdomen/neutral, external rotation 20 degrees open end feel, no pain throughout.  Patient has full range of motion wrist hand elbow    8/12 ER 20deg  8/22 145 AAROM flexion   Strength  8/8/2024  3/5 wrist hand elbow no resistance applied    Treatments:   Ther Ex  UBE x 6'  10lb cable row-mid 3 x 15  10lb cable row-low 3 x 15  7.5lb IR 3x10 cable row  5lb ER 3 x 10   7.5lb horizontal add 3 x 10 cable neutral  7.5lb horizontal add 3 x 10 cable diagonals  Static hold jobes 2 x 15 3#  IR stretch 10 x 10\"  Manual    Neuro Re-ed    There-Act    Modalities    Declined ice     PT Therapeutic Procedures Time Entry  Therapeutic Exercise Time Entry: 40     HEP Access Code:[email or web address]   Assessment:  No increased pain with treatment, patient progressed well overall, emphasis on UE strength interventions today for IR and ADD     Plan: Continue plan of care     Goals:  Active       PT Problem       PT Goal 1 (Met)       Start:  08/08/24    Expected End:  09/05/24    " Resolved:  09/17/24    1.  Patient to be independent with home exercise program  2.  Patient reported pain less than 3/10 with active assisted range of motion  3.  Patient to have improved shoulder flexion 130 degrees pain less than 3/10 for functional mobility         PT Goal 2 (Progressing)       Start:  08/08/24    Expected End:  10/03/24       1. The patient will report a decrease in score to 550%. Demonstrating a clinically significant change (MCID -18) in 6 weeks.   2. pain less than 2/10 shoulder range of motion 150 degrees active assisted  3.  Patient to have weaned from sling completely following postoperative precautions  4. active range of motion shoulder flexion 140 degrees for participation and overhead activity              Benoit Corona, PT

## 2024-09-26 ENCOUNTER — TREATMENT (OUTPATIENT)
Dept: PHYSICAL THERAPY | Facility: CLINIC | Age: 39
End: 2024-09-26
Payer: COMMERCIAL

## 2024-09-26 DIAGNOSIS — S29.011D PECTORALIS MUSCLE RUPTURE, SUBSEQUENT ENCOUNTER: Primary | ICD-10-CM

## 2024-09-26 PROCEDURE — 97110 THERAPEUTIC EXERCISES: CPT | Mod: GP | Performed by: PHYSICAL THERAPIST

## 2024-09-26 NOTE — PROGRESS NOTES
Physical Therapy  Physical Therapy Treatment Note    Patient Name: Fran Garcia  MRN: [id number]  Today's Date: 9/26/2024  Time Calculation  Start Time: 0815  Stop Time: 0858  Time Calculation (min): 43 min       PT Therapeutic Procedures Time Entry  Therapeutic Exercise Time Entry: 43     Referring: Molly Jensen CNP    Insurance:  Visit [place]: 13 of [place]    Authorization info: No Auth  Insurance Type: Payor: AETNA / Plan: AETNA  HEALTHCARE / Product Type: *No Product type* /     Current Problem  No diagnosis found.            General   7/11/24 R shoulder Pec Repair     Precautions     ER less than 30 deg  Subjective:   Subjective   Patient reports he feels good, strengthening is going well.   HEP compliance: YES  Pain   Minor discomfort at times  Objective:   Objective   Other Outcome Measures: 100% dash   8/8/2024  ROM  8/8/2024  Right shoulder  Flexion 90 degrees open and feel, internal rotation to the abdomen/neutral, external rotation 20 degrees open end feel, no pain throughout.  Patient has full range of motion wrist hand elbow    8/12 ER 20deg  8/22 145 AAROM flexion   Strength  8/8/2024  3/5 wrist hand elbow no resistance applied    Treatments:   Ther Ex  UBE x 6'  10lb cable row-mid 3 x 15  10lb cable row-low diagonals 3 x 15  7.5lb IR 3x10 cable row  5lb ER 3 x 10   7.5lb horizontal add 3 x 10 cable neutral  7.5lb horizontal add 3 x 10 cable diagonals    Manual    Neuro Re-ed    There-Act    Modalities    Declined ice     PT Therapeutic Procedures Time Entry  Therapeutic Exercise Time Entry: 43     HEP Access Code:[email or web address]   Assessment:  No increased pain with treatment, moderate fatigue at the end of treatment  Plan: Continue plan of care     Goals:  Active       PT Problem       PT Goal 1 (Met)       Start:  08/08/24    Expected End:  09/05/24    Resolved:  09/17/24    1.  Patient to be independent with home exercise program  2.  Patient reported pain less than 3/10 with active assisted range of  motion  3.  Patient to have improved shoulder flexion 130 degrees pain less than 3/10 for functional mobility         PT Goal 2 (Progressing)       Start:  08/08/24    Expected End:  10/03/24       1. The patient will report a decrease in score to 550%. Demonstrating a clinically significant change (MCID -18) in 6 weeks.   2. pain less than 2/10 shoulder range of motion 150 degrees active assisted  3.  Patient to have weaned from sling completely following postoperative precautions  4. active range of motion shoulder flexion 140 degrees for participation and overhead activity              Benoit Corona, PT

## 2024-09-27 DIAGNOSIS — I10 HYPERTENSION, UNSPECIFIED TYPE: ICD-10-CM

## 2024-09-27 RX ORDER — OLMESARTAN MEDOXOMIL 20 MG/1
20 TABLET ORAL DAILY
Qty: 90 TABLET | Refills: 3 | Status: SHIPPED | OUTPATIENT
Start: 2024-09-27

## 2024-10-04 ENCOUNTER — TREATMENT (OUTPATIENT)
Dept: PHYSICAL THERAPY | Facility: CLINIC | Age: 39
End: 2024-10-04
Payer: COMMERCIAL

## 2024-10-04 DIAGNOSIS — S29.011D PECTORALIS MUSCLE RUPTURE, SUBSEQUENT ENCOUNTER: Primary | ICD-10-CM

## 2024-10-04 PROCEDURE — 97110 THERAPEUTIC EXERCISES: CPT | Mod: GP | Performed by: PHYSICAL THERAPIST

## 2024-10-04 NOTE — PROGRESS NOTES
Physical Therapy  Physical Therapy Treatment Note    Patient Name: Fran Garcia  MRN: [id number]  Today's Date: 10/4/2024  Time Calculation  Start Time: 1215  Stop Time: 1255  Time Calculation (min): 40 min       PT Therapeutic Procedures Time Entry  Therapeutic Exercise Time Entry: 40     Referring: Molly Jensen CNP    Insurance:  Visit number: 13 of [place]    Authorization info: No Auth  Insurance Type: Payor: GENERIC COMMERCIAL / Plan: GENERIC COMMERCIAL / Product Type: *No Product type* /     Current Problem  No diagnosis found.            General   7/11/24 R shoulder Pec Repair     Precautions     ER less than 30 deg  Subjective:   Subjective   Patient reports he feels good, strengthening is going well.   HEP compliance: YES  Pain   Minor discomfort at times  Objective:   Objective   Other Outcome Measures: 100% dash   8/8/2024  ROM  8/8/2024  Right shoulder  Flexion 90 degrees open and feel, internal rotation to the abdomen/neutral, external rotation 20 degrees open end feel, no pain throughout.  Patient has full range of motion wrist hand elbow    8/12 ER 20deg  8/22 145 AAROM flexion   Strength  8/8/2024  3/5 wrist hand elbow no resistance applied    Treatments:   Ther Ex  UBE x 6'  BFR 50% 10lb mid row and 8lb BFR 50% occlusion 77mmhg x 30 2 x 15  11lb med ball diagonals, CW, CCW x 15 ea  10lb horizontal add 3 x 10 cable neutral  10b horizontal add 3 x 10 cable diagonals    Manual    Neuro Re-ed    There-Act    Modalities    Declined ice     PT Therapeutic Procedures Time Entry  Therapeutic Exercise Time Entry: 40     HEP Access Code:[email or web address]   Assessment:  No increased pain with treatment, pec strength is improving. Mild Fatigue with horizontal adduction  Plan: Continue plan of care     Goals:  Active       PT Problem       PT Goal 1 (Met)       Start:  08/08/24    Expected End:  09/05/24    Resolved:  09/17/24    1.  Patient to be independent with home exercise program  2.  Patient reported pain less than  3/10 with active assisted range of motion  3.  Patient to have improved shoulder flexion 130 degrees pain less than 3/10 for functional mobility         PT Goal 2 (Progressing)       Start:  08/08/24    Expected End:  10/03/24       1. The patient will report a decrease in score to 550%. Demonstrating a clinically significant change (MCID -18) in 6 weeks.   2. pain less than 2/10 shoulder range of motion 150 degrees active assisted  3.  Patient to have weaned from sling completely following postoperative precautions  4. active range of motion shoulder flexion 140 degrees for participation and overhead activity              Benoit Corona, PT

## 2024-10-10 ENCOUNTER — TREATMENT (OUTPATIENT)
Dept: PHYSICAL THERAPY | Facility: CLINIC | Age: 39
End: 2024-10-10
Payer: COMMERCIAL

## 2024-10-10 DIAGNOSIS — S29.011D PECTORALIS MUSCLE RUPTURE, SUBSEQUENT ENCOUNTER: Primary | ICD-10-CM

## 2024-10-10 PROCEDURE — 97110 THERAPEUTIC EXERCISES: CPT | Mod: GP | Performed by: PHYSICAL THERAPIST

## 2024-10-10 NOTE — PROGRESS NOTES
Physical Therapy  Physical Therapy Treatment Note    Patient Name: Fran Garcia  MRN: [id number]  Today's Date: 10/10/2024  Time Calculation  Start Time: 0728  Stop Time: 0816  Time Calculation (min): 48 min       PT Therapeutic Procedures Time Entry  Therapeutic Exercise Time Entry: 42     Referring: Molly Jensen CNP    Insurance:  Visit number: 13 of [place]    Authorization info: No Auth  Insurance Type: Payor: [employer]IC COMMERCIAL / Plan: GENERIC COMMERCIAL / Product Type: *No Product type* /     Current Problem  1. Pectoralis muscle rupture, subsequent encounter                    General   7/11/24 R shoulder Pec Repair     Precautions       Subjective:   Subjective   Patient reports he feels good, strengthening is going well.   HEP compliance: YES  Pain   Minor discomfort at times  Objective:   Objective   Other Outcome Measures: 100% dash   8/8/2024  ROM  8/8/2024  Right shoulder  Flexion 90 degrees open and feel, internal rotation to the abdomen/neutral, external rotation 20 degrees open end feel, no pain throughout.  Patient has full range of motion wrist hand elbow    8/12 ER 20deg  8/22 145 AAROM flexion   Strength  8/8/2024  3/5 wrist hand elbow no resistance applied    Treatments:   Ther Ex  UBE x 6'  Horizontal add 3 x10 10lb  Jose G superman 10lb 3 x 10  3# jobes isometric 2 x 10 all direction   Bicep and tricep 10lbs 3 x 15  ER 7.5lbs 3x10    Manual    Neuro Re-ed    There-Act    Modalities    Declined ice     PT Therapeutic Procedures Time Entry  Therapeutic Exercise Time Entry: 42     HEP Access Code:[email or web address]   Assessment:  No increased pain with treatment,progressing well with resistance exercise   Plan: Continue plan of care     Goals:  Active       PT Problem       PT Goal 1 (Met)       Start:  08/08/24    Expected End:  09/05/24    Resolved:  09/17/24    1.  Patient to be independent with home exercise program  2.  Patient reported pain less than 3/10 with active assisted range of motion  3.  Patient to  have improved shoulder flexion 130 degrees pain less than 3/10 for functional mobility         PT Goal 2 (Progressing)       Start:  08/08/24    Expected End:  10/03/24       1. The patient will report a decrease in score to 550%. Demonstrating a clinically significant change (MCID -18) in 6 weeks.   2. pain less than 2/10 shoulder range of motion 150 degrees active assisted  3.  Patient to have weaned from sling completely following postoperative precautions  4. active range of motion shoulder flexion 140 degrees for participation and overhead activity              Benoit Corona, PT

## 2024-10-16 ENCOUNTER — TREATMENT (OUTPATIENT)
Dept: PHYSICAL THERAPY | Facility: CLINIC | Age: 39
End: 2024-10-16
Payer: COMMERCIAL

## 2024-10-16 DIAGNOSIS — S29.011D PECTORALIS MUSCLE RUPTURE, SUBSEQUENT ENCOUNTER: Primary | ICD-10-CM

## 2024-10-16 PROCEDURE — 97110 THERAPEUTIC EXERCISES: CPT | Mod: GP | Performed by: PHYSICAL THERAPIST

## 2024-10-16 NOTE — PROGRESS NOTES
"  Physical Therapy  Physical Therapy Treatment Note    Patient Name: Fran Garcia  MRN: [id number]  Today's Date: 10/16/2024                Referring: Molly Jensen CNP    Insurance:  Visit number: 16 of [place]    Authorization info: No Auth  Insurance Type: Payor: GENERIC COMMERCIAL / Plan: GENERIC COMMERCIAL / Product Type: *No Product type* /     Current Problem  1. Pectoralis muscle rupture, subsequent encounter                      General   7/11/24 R shoulder Pec Repair     Precautions       Subjective:   Subjective   Patient reports he feels good, strengthening is going well.   HEP compliance: YES  Pain   Minor discomfort at times  Objective:   Objective   Other Outcome Measures: 100% dash   8/8/2024  ROM  8/8/2024  Right shoulder  Flexion 90 degrees open and feel, internal rotation to the abdomen/neutral, external rotation 20 degrees open end feel, no pain throughout.  Patient has full range of motion wrist hand elbow    8/12 ER 20deg  8/22 145 AAROM flexion   Strength  8/8/2024  3/5 wrist hand elbow no resistance applied    Treatments:   Ther Ex  UBE x 6'  Bicep curl 3 x 15 jose g  Jose G superman 10lb 3 x 10  IR stretch strap 10 x 10\"  Incline horizontal add 3 x 10 10 lb  Horizontal add 3 x10 10lb  2# jobes isometric 2 x 10 all direction     Manual    Neuro Re-ed    There-Act    Modalities    Declined ice           HEP Access Code:[email or web address]   Assessment:  No increased pain with treatment, no pain with strengthening, he is excelling well  Plan: Continue plan of care     Goals:  Active       PT Problem       PT Goal 1 (Met)       Start:  08/08/24    Expected End:  09/05/24    Resolved:  09/17/24    1.  Patient to be independent with home exercise program  2.  Patient reported pain less than 3/10 with active assisted range of motion  3.  Patient to have improved shoulder flexion 130 degrees pain less than 3/10 for functional mobility         PT Goal 2 (Progressing)       Start:  08/08/24    Expected End:  10/03/24       1. " The patient will report a decrease in score to 550%. Demonstrating a clinically significant change (MCID -18) in 6 weeks.   2. pain less than 2/10 shoulder range of motion 150 degrees active assisted  3.  Patient to have weaned from sling completely following postoperative precautions  4. active range of motion shoulder flexion 140 degrees for participation and overhead activity              Benoit Corona, PT

## 2024-10-18 ENCOUNTER — TREATMENT (OUTPATIENT)
Dept: PHYSICAL THERAPY | Facility: CLINIC | Age: 39
End: 2024-10-18
Payer: COMMERCIAL

## 2024-10-18 DIAGNOSIS — S29.011D PECTORALIS MUSCLE RUPTURE, SUBSEQUENT ENCOUNTER: Primary | ICD-10-CM

## 2024-10-18 PROCEDURE — 97110 THERAPEUTIC EXERCISES: CPT | Mod: GP | Performed by: PHYSICAL THERAPIST

## 2024-10-18 NOTE — PROGRESS NOTES
Physical Therapy  Physical Therapy Treatment Note    Patient Name: Fran Garcia  MRN: [id number]  Today's Date: 10/18/2024  Time Calculation  Start Time: 0820  Stop Time: 0900  Time Calculation (min): 40 min       PT Therapeutic Procedures Time Entry  Therapeutic Exercise Time Entry: 40     Referring: Molly Jensen CNP    Insurance:  Visit [place]: [place]    Authorization info: No Auth  Insurance Type: Payor: [employer]ERCIAL / Plan: [employer]IC COMMERCIAL / Product Type: *No Product type* /     Current Problem  1. Pectoralis muscle rupture, subsequent encounter                        General   7/11/24 R shoulder Pec Repair     Precautions       Subjective:   Subjective   Patient reports he feels good, R side feels better than the L  HEP compliance: YES  Pain   Minor discomfort at times  Objective:   Objective   Other Outcome Measures: 100% dash   8/8/2024  ROM  8/8/2024  Right shoulder  Flexion 90 degrees open and feel, internal rotation to the abdomen/neutral, external rotation 20 degrees open end feel, no pain throughout.  Patient has full range of motion wrist hand elbow    8/12 ER 20deg  8/22 145 AAROM flexion   Strength  8/8/2024  3/5 wrist hand elbow no resistance applied    Treatments:   Ther Ex  UBE x 6'  Bicep curl 3 x 15 nasreen 10 lb  Decline horizontal ADD 10lb 3 x 15  X pattern incline 7.5# 3 x 15  Incline horizontal add 3 x 10 10 lb    Manual    Neuro Re-ed    There-Act    Modalities    Declined ice     PT Therapeutic Procedures Time Entry  Therapeutic Exercise Time Entry: 40     HEP Access Code:[email or web address]   Assessment:  Tolerated treatment well without added pain, mild fatigue with strengthening  Plan: Continue plan of care     Goals:  Active       PT Problem       PT Goal 1 (Met)       Start:  08/08/24    Expected End:  09/05/24    Resolved:  09/17/24    1.  Patient to be independent with home exercise program  2.  Patient reported pain less than 3/10 with active assisted range of motion  3.  Patient to  have improved shoulder flexion 130 degrees pain less than 3/10 for functional mobility         PT Goal 2 (Progressing)       Start:  08/08/24    Expected End:  10/03/24       1. The patient will report a decrease in score to 550%. Demonstrating a clinically significant change (MCID -18) in 6 weeks.   2. pain less than 2/10 shoulder range of motion 150 degrees active assisted  3.  Patient to have weaned from sling completely following postoperative precautions  4. active range of motion shoulder flexion 140 degrees for participation and overhead activity              Benoit Corona, PT

## 2024-10-21 ENCOUNTER — TREATMENT (OUTPATIENT)
Dept: PHYSICAL THERAPY | Facility: CLINIC | Age: 39
End: 2024-10-21
Payer: COMMERCIAL

## 2024-10-21 DIAGNOSIS — S29.011D PECTORALIS MUSCLE RUPTURE, SUBSEQUENT ENCOUNTER: Primary | ICD-10-CM

## 2024-10-21 PROCEDURE — 97110 THERAPEUTIC EXERCISES: CPT | Mod: GP | Performed by: PHYSICAL THERAPIST

## 2024-10-21 NOTE — PROGRESS NOTES
"  Physical Therapy  Physical Therapy Treatment Note    Patient Name: Fran Garcia  MRN: [id number]  Today's Date: 10/21/2024  Time Calculation  Start Time: 0813  Stop Time: 0900  Time Calculation (min): 47 min       PT Therapeutic Procedures Time Entry  Therapeutic Exercise Time Entry: 45     Referring: Molly Jensen CNP    Insurance:  Visit number: [place]    Authorization info: No Auth  Insurance Type: Payor: [employer] COMMERCIAL / Plan: GENERIC COMMERCIAL / Product Type: *No Product type* /     Current Problem  No diagnosis found.                  General   7/11/24 R shoulder Pec Repair     Precautions       Subjective:   Subjective   Patient reports he feels good, R side feels better than the L  HEP compliance: YES  Pain   Minor discomfort at times  Objective:   Objective   Other Outcome Measures: 100% dash   8/8/2024  ROM  8/8/2024  Right shoulder  Flexion 90 degrees open and feel, internal rotation to the abdomen/neutral, external rotation 20 degrees open end feel, no pain throughout.  Patient has full range of motion wrist hand elbow    8/12 ER 20deg  8/22 145 AAROM flexion   Strength  8/8/2024  3/5 wrist hand elbow no resistance applied    Treatments:   Ther Ex  UBE x 6'  Bicep curl 3 x 15 nasreen 10 lb  Tricep ext 10lb 3 x10  Decline horizontal ADD 10lb 3 x 15  X pattern incline 10# 3 x 15  Incline horizontal add 3 x 10 10 lb  ER 5# 3 x 10  IR 10lb 3 x 10  Y strap overhead stretch 10 x 10\"  Manual        Modalities    Declined ice     PT Therapeutic Procedures Time Entry  Therapeutic Exercise Time Entry: 45     HEP Access Code:[email or web address]   Assessment:  Progressing excellently with stretching, no fatigue or post session soreness  Plan: Continue plan of care     Goals:  Active       PT Problem       PT Goal 1 (Met)       Start:  08/08/24    Expected End:  09/05/24    Resolved:  09/17/24    1.  Patient to be independent with home exercise program  2.  Patient reported pain less than 3/10 with active assisted range of " motion  3.  Patient to have improved shoulder flexion 130 degrees pain less than 3/10 for functional mobility         PT Goal 2 (Progressing)       Start:  08/08/24    Expected End:  10/03/24       1. The patient will report a decrease in score to 550%. Demonstrating a clinically significant change (MCID -18) in 6 weeks.   2. pain less than 2/10 shoulder range of motion 150 degrees active assisted  3.  Patient to have weaned from sling completely following postoperative precautions  4. active range of motion shoulder flexion 140 degrees for participation and overhead activity              Benoit Corona, PT

## 2024-10-24 ENCOUNTER — TREATMENT (OUTPATIENT)
Dept: PHYSICAL THERAPY | Facility: CLINIC | Age: 39
End: 2024-10-24
Payer: COMMERCIAL

## 2024-10-24 DIAGNOSIS — S29.011D PECTORALIS MUSCLE RUPTURE, SUBSEQUENT ENCOUNTER: Primary | ICD-10-CM

## 2024-10-24 PROCEDURE — 97110 THERAPEUTIC EXERCISES: CPT | Mod: GP | Performed by: PHYSICAL THERAPIST

## 2024-10-24 NOTE — PROGRESS NOTES
"  Physical Therapy  Physical Therapy Treatment Note    Patient Name: Fran Garcia  MRN: [id number]  Today's Date: 10/24/2024  Time Calculation  Start Time: 0818  Stop Time: 0900  Time Calculation (min): 42 min       PT Therapeutic Procedures Time Entry  Therapeutic Exercise Time Entry: 40     Referring: Molly Jensen CNP    Insurance:  Visit number: [place]    Authorization info: No Auth  Insurance Type: Payor: GENERIC COMMERCIAL / Plan: GENERIC COMMERCIAL / Product Type: *No Product type* /     Current Problem  No diagnosis found.                  General   7/11/24 R shoulder Pec Repair     Precautions       Subjective:   Subjective   Patient reports he feels good, he did sign up for the gym membership at [employer]  HEP compliance: YES  Pain   No discomfort at rest  Objective:   Objective   Other Outcome Measures: 100% dash   8/8/2024  ROM  He has full range of motion at this time for overhead flexion abduction internal and external rotation  Strength  Utilization of 10 pounds as protocol has allowed    Treatments:   Ther Ex  UBE x 6'  Bicep curl 3 x 15 nasreen 10 lb  Half kneel med ball chops incline 2 x 15 ea 11lbs bilateral  Y strap overhead stretch 10 x 10\"  Med ball rotations 0-50 11lbs nasreen  IR stretch behind back with cane 10x 10\"  ER 7.5# 3 x 10  IR 10lb 3 x 10  Decline horizontal ADD 10lb 3 x 15  Tricep ext 10lb 3 x15    Manual        Modalities    Declined ice     PT Therapeutic Procedures Time Entry  Therapeutic Exercise Time Entry: 40     HEP Access Code:[email or web address]   Assessment: He continues to show good improvement throughout upper extremity strengthening regimen and stability activities.  He has no issues performing the 10 pound restriction from the surgeon.  Did review gym related activity   plan: He will follow-up with the surgeon on Tuesday     Goals:  Resolved       PT Problem       PT Goal 1 (Met)       Start:  08/08/24    Expected End:  09/05/24    Resolved:  09/17/24    1.  Patient to be " independent with home exercise program  2.  Patient reported pain less than 3/10 with active assisted range of motion  3.  Patient to have improved shoulder flexion 130 degrees pain less than 3/10 for functional mobility         PT Goal 2 (Met)       Start:  08/08/24    Expected End:  10/03/24    Resolved:  10/24/24    1. The patient will report a decrease in score to 550%. Demonstrating a clinically significant change (MCID -18) in 6 weeks.   2. pain less than 2/10 shoulder range of motion 150 degrees active assisted  3.  Patient to have weaned from sling completely following postoperative precautions  4. active range of motion shoulder flexion 140 degrees for participation and overhead activity              Benoit Corona, PT

## 2024-10-29 ENCOUNTER — OFFICE VISIT (OUTPATIENT)
Dept: ORTHOPEDIC SURGERY | Facility: HOSPITAL | Age: 39
End: 2024-10-29
Payer: COMMERCIAL

## 2024-10-29 DIAGNOSIS — M25.511 RIGHT SHOULDER PAIN, UNSPECIFIED CHRONICITY: Primary | ICD-10-CM

## 2024-10-29 PROCEDURE — 99212 OFFICE O/P EST SF 10 MIN: CPT | Performed by: ORTHOPAEDIC SURGERY

## 2025-07-07 DIAGNOSIS — I10 HYPERTENSION, UNSPECIFIED TYPE: ICD-10-CM

## 2025-07-07 RX ORDER — OLMESARTAN MEDOXOMIL 20 MG/1
20 TABLET ORAL DAILY
Qty: 90 TABLET | Refills: 3 | Status: SHIPPED | OUTPATIENT
Start: 2025-07-07

## (undated) DEVICE — SUTURE, VICRYL, 3-0, 27 IN, CT-2, UNDYED

## (undated) DEVICE — SUTURE, VICRYL, 0, 36 IN, CT-1, UNDYED

## (undated) DEVICE — SUTURE, MONOCRYL, 3-0, 27 IN, PS-2, UNDYED

## (undated) DEVICE — ADHESIVE, SKIN, DERMABOND ADVANCED, 15CM, PEN-STYLE

## (undated) DEVICE — SUTURE TAPE, 1.3MM 40IN, BLK/WH, W/TAPER NDL 36.6MM

## (undated) DEVICE — Device

## (undated) DEVICE — NEEDLE, REVERSE CUTTING, W/NITINOL LOOP, C-13, 1/2 CIRCLE, 36.6MML

## (undated) DEVICE — GLOVE, SURGICAL, PROTEXIS PI , 8.0, PF, LF

## (undated) DEVICE — TIP, SUCTION, FRAZIER, W/CONTROL VENT, 12 FR

## (undated) DEVICE — SUTURE, PROLENE, 3-0, 18 IN, PS2, BLUE

## (undated) DEVICE — GLOVE, SURGICAL, PROTEXIS PI , 6.5, PF, LF

## (undated) DEVICE — BITE BLOCK, SOFT, MOUTH, 3/4 X 4, LARGE, WHITE

## (undated) DEVICE — ICEMAN SYS, W/WRAP-0N COLD PAD, UNIV, LOOP, NS, RH

## (undated) DEVICE — GLOVE, SURGICAL, PROTEXIS PI BLUE W/NEUTHERA, 6.5, PF, LF

## (undated) DEVICE — STRIP, SKIN CLOSURE, STERI STRIP, NON-REINFORCED, 0.5 X 4 IN

## (undated) DEVICE — GLOVE, SURGICAL, PROTEXIS PI BLUE W/NEUTHERA, 8.5, PF, LF

## (undated) DEVICE — DRAPE, INSTRUMENT, W/POUCH, STERI DRAPE, 7 X 11 IN, DISPOSABLE, STERILE

## (undated) DEVICE — HANDPIECE, INTERPULSE, W/FAN SPRAY TIP AND SUCTION TUBE

## (undated) DEVICE — MASK, FACE, TENET, FOAM POSITIONING, DISPOSABLE

## (undated) DEVICE — TIP, SUCTION, YANKAUER, FLEXIBLE